# Patient Record
Sex: MALE | Race: WHITE | NOT HISPANIC OR LATINO
[De-identification: names, ages, dates, MRNs, and addresses within clinical notes are randomized per-mention and may not be internally consistent; named-entity substitution may affect disease eponyms.]

---

## 2020-09-02 ENCOUNTER — TRANSCRIPTION ENCOUNTER (OUTPATIENT)
Age: 45
End: 2020-09-02

## 2022-04-06 ENCOUNTER — APPOINTMENT (OUTPATIENT)
Dept: PAIN MANAGEMENT | Facility: CLINIC | Age: 47
End: 2022-04-06
Payer: COMMERCIAL

## 2022-04-06 VITALS — WEIGHT: 140 LBS | HEIGHT: 68 IN | BODY MASS INDEX: 21.22 KG/M2

## 2022-04-06 PROBLEM — Z00.00 ENCOUNTER FOR PREVENTIVE HEALTH EXAMINATION: Status: ACTIVE | Noted: 2022-04-06

## 2022-04-06 PROCEDURE — 99213 OFFICE O/P EST LOW 20 MIN: CPT

## 2022-04-06 NOTE — ASSESSMENT
[FreeTextEntry1] : pt post op surgery #4- posterior cervical fusion pt with increased pain, will refill meds\par UTOX done Utox\par Urine Toxicology Screening-   For the monitoring of chronic pain patients with possible opiod dependence and/or suspected abuse of other illicit drugs.

## 2022-04-06 NOTE — PHYSICAL EXAM
[Able to Communicate] : able to communicate [Normal Skin] : normal skin [Well Developed] : well developed [de-identified] : PHYSICAL EXAM\par \par Constitutional: \par Appears well, no apparent distress\par Ability to communicate: Normal\par Respiratory: non-labored breathing\par Skin: no rash noted\par Head: normocephalic, atraumatic\par Neck: no visible thyroid enlargement\par Eyes: extraocular movements intact\par Neurologic: alert and oriented x3\par Psychiatric: normal mood, affect, and behavior\par \par Cervical:\par Palpation: bilateral trapezial spasm, bilateral trapezius tenderness and bilateral paracervical tenderness\par ROM: Diminished range of motion in all plains.  Patient notes pain at extremes of extension and rotation bilaterally.  Stiffness at extremes of extension and rotation bilaterally\par MMT: Motor exam is 5/5 through out bilateral upper extremities.\par Sensation: Light touch and pain is intact throughout bilateral upper extremities.\par Reflexes: No sustained clonus.\par Special Testing: Positive bilateral Spurling test \par \par Assessemnt:\par Radiculopathy of cervical region (M54.12)\par Cervicalgia (M54.2)\par \par \par Plan:\par After discussing various treatment options with the patient including but not limited to oral medications, physical therapy, exercise modalities as well as interventional spinal injections, we have decided with the following plan:\par \par Continue home exercises, stretching, activity modification, physical therapy, and conservative care.\par \par Medication Renewal\par \par The patient is stable on current pain medication with analgesia and without notable side effects or any obvious aberrant behaviors exhibited. \par There is clinically meaningful improvement in pain and function that outweighs risks to patient safety. I have discussed risks and realistic benefits of opioid therapy and patient and clinician responsibilities for managing therapy. Pain agreement has been signed. \par Will renew medication today.\par \par \par Conservative Care\par Continue Home exercises, stretching, activity modification, physical therapy, and conservative care.\par \par  NO NEW FINDINGS\par Patient denies new numbness, tingling, weakness, fevers, chills and bowel/bladder incontinence\par \par  \par I have consulted the  Registry for the purpose of reviewing the patient's controlled substance\par \par \par \par \par \par  [FreeTextEntry9] : + cervical collar

## 2022-04-06 NOTE — HISTORY OF PRESENT ILLNESS
[Neck] : neck [10] : 10 [Radiating] : radiating [Shooting] : shooting [Tightness] : tightness [] : yes [Constant] : constant [FreeTextEntry1] : patient is coming in for a medication refill

## 2022-04-18 ENCOUNTER — APPOINTMENT (OUTPATIENT)
Dept: PAIN MANAGEMENT | Facility: CLINIC | Age: 47
End: 2022-04-18
Payer: COMMERCIAL

## 2022-04-18 VITALS — BODY MASS INDEX: 23.49 KG/M2 | WEIGHT: 155 LBS | HEIGHT: 68 IN

## 2022-04-18 DIAGNOSIS — Z78.9 OTHER SPECIFIED HEALTH STATUS: ICD-10-CM

## 2022-04-18 PROCEDURE — 99214 OFFICE O/P EST MOD 30 MIN: CPT

## 2022-04-18 NOTE — HISTORY OF PRESENT ILLNESS
[Neck] : neck [10] : 10 [5] : 5 [Constant] : constant [Household chores] : household chores [Leisure] : leisure [Sleep] : sleep [Meds] : meds [Ice] : ice [Sitting] : sitting [Walking] : walking [] : no [FreeTextEntry7] : right shoulder

## 2022-04-18 NOTE — ASSESSMENT
[FreeTextEntry1] : sp cervical fusion 5 weeks prior with persisent post operative pain\par will taper down to 15mg tid for 2 weeks\par \par Medication Renewal\par \par The patient is stable on current pain medication with analgesia and without notable side effects or any obvious aberrant behaviors exhibited. \par There is clinically meaningful improvement in pain and function that outweighs risks to patient safety. I have discussed risks and realistic benefits of opioid therapy and patient and clinician responsibilities for managing therapy. Pain agreement has been signed. \par Will renew medication today.\par \par Follow up in 4-6 weeks or sooner if there are any problems.\par \par Conservative Care\par Continue Home exercises, stretching, activity modification, physical therapy, and conservative care.\par \par  NO NEW FINDINGS\par Patient denies new numbness, tingling, weakness, fevers, chills and bowel/bladder incontinence\par \par  \par I have consulted the  Registry for the purpose of reviewing the patient's controlled substance\par \par utox reviewed and wnl\par

## 2022-04-18 NOTE — PHYSICAL EXAM
[de-identified] : PHYSICAL EXAM\par \par Constitutional: \par Appears well, no apparent distress\par Ability to communicate: Normal\par Respiratory: non-labored breathing\par Skin: no rash noted\par Head: normocephalic, atraumatic\par Neck: no visible thyroid enlargement\par Eyes: extraocular movements intact\par Neurologic: alert and oriented x3\par Psychiatric: normal mood, affect, and behavior\par \par \par Neck: Palpation of the cervical spine is as follows: left trapezial spasm, right trapezial spasm, left trapezial tenderness, right trapezial tenderness, left paracervical spasm, right paracervical spasm, left paracervical tenderness and right paracervical tenderness. Range of motion of the cervical spine is as follows: diminished range of motion in all planes Pain at extremes of rotation to right and rotation to left. Stiffness at extremes of rotation to right and rotation to left. Strength Testing for the cervical spine is as follows: Left Deltoid strength 5/5, Right Deltoid strength 5/5, Left Biceps 5/5, Right Biceps 5/5, Left Triceps 5/5, Right Triceps 5/5, Left Wrist Flexors 5/5, Right Wrist Flexors 5/5, Left Finger Abductors 5/5, Right Finger Abductors 5/5, Left Grasp 5/5 and Right Grasp 5/5 Neurological testing for the cervical spine is as follows: positive bilateral facet loading. light touch is intact throughout both upper extremities, normal deep tendon reflexes bilateral upper extremities, negative Spurling test and negative Ferguson reflex \par \par \par \par \par \par

## 2022-05-05 ENCOUNTER — APPOINTMENT (OUTPATIENT)
Dept: PAIN MANAGEMENT | Facility: CLINIC | Age: 47
End: 2022-05-05
Payer: COMMERCIAL

## 2022-05-05 VITALS — BODY MASS INDEX: 23.49 KG/M2 | WEIGHT: 155 LBS | HEIGHT: 68 IN

## 2022-05-05 DIAGNOSIS — Z78.9 OTHER SPECIFIED HEALTH STATUS: ICD-10-CM

## 2022-05-05 PROCEDURE — 99213 OFFICE O/P EST LOW 20 MIN: CPT

## 2022-05-05 NOTE — DISCUSSION/SUMMARY
[de-identified] : stable on oxycodone 15mg tid and gabapentin\par \par Medication Renewal\par \par The patient is stable on current pain medication with analgesia and without notable side effects or any obvious aberrant behaviors exhibited. \par There is clinically meaningful improvement in pain and function that outweighs risks to patient safety. I have discussed risks and realistic benefits of opioid therapy and patient and clinician responsibilities for managing therapy. Pain agreement has been signed. \par Will renew medication today.\par \par Follow up in 4-6 weeks or sooner if there are any problems.\par \par Conservative Care\par Continue Home exercises, stretching, activity modification, physical therapy, and conservative care.\par \par  NO NEW FINDINGS\par Patient denies new numbness, tingling, weakness, fevers, chills and bowel/bladder incontinence\par \par  \par I have consulted the  Registry for the purpose of reviewing the patient's controlled substance\par \par

## 2022-05-05 NOTE — PHYSICAL EXAM
[de-identified] : PHYSICAL EXAM\par \par Constitutional: \par Appears well, no apparent distress\par Ability to communicate: Normal\par Respiratory: non-labored breathing\par Skin: no rash noted\par Head: normocephalic, atraumatic\par Neck: no visible thyroid enlargement\par Eyes: extraocular movements intact\par Neurologic: alert and oriented x3\par Psychiatric: normal mood, affect, and behavior\par \par Cervical:\par Palpation: left trapezial spasm, left trapezius tenderness and left paracervical tenderness\par ROM: Diminished range of motion in all plains.  Patient notes pain at extremes of extension and rotation to left.  Stiffness at extremes of extension and rotation to the left\par MMT: Motor exam is 5/5 through out bilateral upper extremities.\par Sensation: Light touch and pain is intact throughout bilateral upper extremities.\par Reflexes: No sustained clonus.\par Special Testing: Positive left Spurling test \par \par Assessemnt:\par Radiculopathy of cervical region (M54.12)\par Cervicalgia (M54.2)\par \par

## 2022-05-05 NOTE — HISTORY OF PRESENT ILLNESS
[Neck] : neck [9] : 9 [Burning] : burning [8] : 8 [Radiating] : radiating [Sharp] : sharp [Shooting] : shooting [Stabbing] : stabbing [Tingling] : tingling [Constant] : constant [Sleep] : sleep [Meds] : meds [Sitting] : sitting [Bending forward] : bending forward [Full time] : Work status: full time [] : no [FreeTextEntry6] : numbness [FreeTextEntry7] : down into the left arm  [de-identified] : movement of the neck

## 2022-05-17 ENCOUNTER — APPOINTMENT (OUTPATIENT)
Dept: PAIN MANAGEMENT | Facility: CLINIC | Age: 47
End: 2022-05-17
Payer: COMMERCIAL

## 2022-05-17 VITALS — WEIGHT: 155 LBS | BODY MASS INDEX: 23.49 KG/M2 | HEIGHT: 68 IN

## 2022-05-17 PROCEDURE — 99213 OFFICE O/P EST LOW 20 MIN: CPT

## 2022-05-17 NOTE — DISCUSSION/SUMMARY
[de-identified] : chronic cervical and left cervical radicular pain\par sp posterior fusion two months prior\par will taper down to percocet 10mg tid\par \par Medication Renewal\par \par The patient is stable on current pain medication with analgesia and without notable side effects or any obvious aberrant behaviors exhibited. \par There is clinically meaningful improvement in pain and function that outweighs risks to patient safety. I have discussed risks and realistic benefits of opioid therapy and patient and clinician responsibilities for managing therapy. Pain agreement has been signed. \par Will renew medication today.\par \par Follow up in 4-6 weeks or sooner if there are any problems.\par \par Conservative Care\par Continue Home exercises, stretching, activity modification, physical therapy, and conservative care.\par \par  NO NEW FINDINGS\par Patient denies new numbness, tingling, weakness, fevers, chills and bowel/bladder incontinence\par \par  \par I have consulted the  Registry for the purpose of reviewing the patient's controlled substance\par \par

## 2022-05-17 NOTE — HISTORY OF PRESENT ILLNESS
[Neck] : neck [9] : 9 [4] : 4 [Radiating] : radiating [Sharp] : sharp [Constant] : constant [Work] : work [Sleep] : sleep [Rest] : rest [Meds] : meds [Sitting] : sitting [Lying in bed] : lying in bed [Light duty] : Work status: light duty [] : no [FreeTextEntry7] : left arm  [de-identified] : mri c spine

## 2022-05-17 NOTE — PHYSICAL EXAM

## 2022-05-27 ENCOUNTER — APPOINTMENT (OUTPATIENT)
Dept: PAIN MANAGEMENT | Facility: CLINIC | Age: 47
End: 2022-05-27
Payer: COMMERCIAL

## 2022-05-27 VITALS — BODY MASS INDEX: 23.49 KG/M2 | HEIGHT: 68 IN | WEIGHT: 155 LBS

## 2022-05-27 PROCEDURE — 99214 OFFICE O/P EST MOD 30 MIN: CPT | Mod: 25

## 2022-05-27 PROCEDURE — 20552 NJX 1/MLT TRIGGER POINT 1/2: CPT

## 2022-05-27 PROCEDURE — J3490M: CUSTOM

## 2022-05-27 NOTE — DISCUSSION/SUMMARY
[de-identified] : chronic cervical and left cervical radicular pain\par sp posterior fusion two months prior\par will taper down to percocet 10mg tid\par \par Medication Renewal\par \par The patient is stable on current pain medication with analgesia and without notable side effects or any obvious aberrant behaviors exhibited. \par There is clinically meaningful improvement in pain and function that outweighs risks to patient safety. I have discussed risks and realistic benefits of opioid therapy and patient and clinician responsibilities for managing therapy. Pain agreement has been signed. \par Will renew medication today.\par \par Follow up in 4-6 weeks or sooner if there are any problems.\par \par Conservative Care\par Continue Home exercises, stretching, activity modification, physical therapy, and conservative care.\par \par  NO NEW FINDINGS\par Patient denies new numbness, tingling, weakness, fevers, chills and bowel/bladder incontinence\par \par  \par I have consulted the  Registry for the purpose of reviewing the patient's controlled substance\par \par

## 2022-05-27 NOTE — PHYSICAL EXAM

## 2022-05-27 NOTE — PROCEDURE
[Trigger point 1-2 muscle groups] : trigger point 1-2 muscle groups [Bilateral] : bilaterally of the [Cervical paraspinal muscle] : cervical paraspinal muscle [___ cc    1%] : Lidocaine ~Vcc of 1%  [___ cc    0.25%] : Bupivacaine (Marcaine) ~Vcc of 0.25%  [___ cc    10mg] : Triamcinolone (Kenalog) ~Vcc of 10 mg

## 2022-05-27 NOTE — HISTORY OF PRESENT ILLNESS
[Neck] : neck [Tightness] : tightness [Constant] : constant [Meds] : meds [9] : 9 [4] : 4 [Radiating] : radiating [Sharp] : sharp [Work] : work [Sleep] : sleep [Rest] : rest [Sitting] : sitting [Lying in bed] : lying in bed [Light duty] : Work status: light duty [de-identified] : pt is following up for his neck pain  [] : no [FreeTextEntry7] : left arm  [de-identified] : mri c spine

## 2022-06-08 ENCOUNTER — APPOINTMENT (OUTPATIENT)
Dept: PAIN MANAGEMENT | Facility: CLINIC | Age: 47
End: 2022-06-08
Payer: COMMERCIAL

## 2022-06-08 PROCEDURE — 99213 OFFICE O/P EST LOW 20 MIN: CPT

## 2022-06-08 NOTE — HISTORY OF PRESENT ILLNESS
[Neck] : neck [6] : 6 [Radiating] : radiating [Constant] : constant [Meds] : meds [Sitting] : sitting [Bending forward] : bending forward [de-identified] : pt is following up for medication refill , pain is going into the shoulders  [] : no [de-identified] : movement

## 2022-06-08 NOTE — DISCUSSION/SUMMARY
[de-identified] : Medication Renewal\par \par The patient is stable on current pain medication with analgesia and without notable side effects or any obvious aberrant behaviors exhibited. \par There is clinically meaningful improvement in pain and function that outweighs risks to patient safety. I have discussed risks and realistic benefits of opioid therapy and patient and clinician responsibilities for managing therapy. Pain agreement has been signed. \par Will renew medication today.\par \par Follow up in 4-6 weeks or sooner if there are any problems.\par \par Conservative Care\par Continue Home exercises, stretching, activity modification, physical therapy, and conservative care.\par \par  NO NEW FINDINGS\par Patient denies new numbness, tingling, weakness, fevers, chills and bowel/bladder incontinence\par \par  \par I have consulted the  Registry for the purpose of reviewing the patient's controlled substance\par \par \par will continue percocet 10/3235 at bid dosing\par

## 2022-06-08 NOTE — PHYSICAL EXAM
[de-identified] : PHYSICAL EXAM\par \par Constitutional: \par Appears well, no apparent distress\par Ability to communicate: Normal\par Respiratory: non-labored breathing\par Skin: no rash noted\par Head: normocephalic, atraumatic\par Neck: no visible thyroid enlargement\par Eyes: extraocular movements intact\par Neurologic: alert and oriented x3\par Psychiatric: normal mood, affect, and behavior\par \par \par Neck: Palpation of the cervical spine is as follows: left trapezial spasm, right trapezial spasm, left trapezial tenderness, right trapezial tenderness, left paracervical spasm, right paracervical spasm, left paracervical tenderness and right paracervical tenderness. Range of motion of the cervical spine is as follows: diminished range of motion in all planes Pain at extremes of rotation to right and rotation to left. Stiffness at extremes of rotation to right and rotation to left. Strength Testing for the cervical spine is as follows: Left Deltoid strength 5/5, Right Deltoid strength 5/5, Left Biceps 5/5, Right Biceps 5/5, Left Triceps 5/5, Right Triceps 5/5, Left Wrist Flexors 5/5, Right Wrist Flexors 5/5, Left Finger Abductors 5/5, Right Finger Abductors 5/5, Left Grasp 5/5 and Right Grasp 5/5 Neurological testing for the cervical spine is as follows: positive bilateral facet loading. light touch is intact throughout both upper extremities, normal deep tendon reflexes bilateral upper extremities, negative Spurling test and negative Ferguson reflex \par \par \par

## 2022-06-10 ENCOUNTER — APPOINTMENT (OUTPATIENT)
Dept: PAIN MANAGEMENT | Facility: CLINIC | Age: 47
End: 2022-06-10

## 2022-06-17 ENCOUNTER — APPOINTMENT (OUTPATIENT)
Dept: PAIN MANAGEMENT | Facility: CLINIC | Age: 47
End: 2022-06-17
Payer: COMMERCIAL

## 2022-06-17 VITALS — HEIGHT: 68 IN | BODY MASS INDEX: 23.49 KG/M2 | WEIGHT: 155 LBS

## 2022-06-17 PROCEDURE — 99213 OFFICE O/P EST LOW 20 MIN: CPT

## 2022-06-17 NOTE — PHYSICAL EXAM
[de-identified] : PHYSICAL EXAM\par \par Constitutional: \par Appears well, no apparent distress\par Ability to communicate: Normal\par Respiratory: non-labored breathing\par Skin: no rash noted\par Head: normocephalic, atraumatic\par Neck: no visible thyroid enlargement\par Eyes: extraocular movements intact\par Neurologic: alert and oriented x3\par Psychiatric: normal mood, affect, and behavior\par \par \par Neck: Palpation of the cervical spine is as follows: left trapezial spasm, right trapezial spasm, left trapezial tenderness, right trapezial tenderness, left paracervical spasm, right paracervical spasm, left paracervical tenderness and right paracervical tenderness. Range of motion of the cervical spine is as follows: diminished range of motion in all planes Pain at extremes of rotation to right and rotation to left. Stiffness at extremes of rotation to right and rotation to left. Strength Testing for the cervical spine is as follows: Left Deltoid strength 5/5, Right Deltoid strength 5/5, Left Biceps 5/5, Right Biceps 5/5, Left Triceps 5/5, Right Triceps 5/5, Left Wrist Flexors 5/5, Right Wrist Flexors 5/5, Left Finger Abductors 5/5, Right Finger Abductors 5/5, Left Grasp 5/5 and Right Grasp 5/5 Neurological testing for the cervical spine is as follows: positive bilateral facet loading. light touch is intact throughout both upper extremities, normal deep tendon reflexes bilateral upper extremities, negative Spurling test and negative Ferguson reflex \par \par \par

## 2022-06-17 NOTE — DISCUSSION/SUMMARY
[de-identified] : Medication Renewal\par \par The patient is stable on current pain medication with analgesia and without notable side effects or any obvious aberrant behaviors exhibited. \par There is clinically meaningful improvement in pain and function that outweighs risks to patient safety. I have discussed risks and realistic benefits of opioid therapy and patient and clinician responsibilities for managing therapy. Pain agreement has been signed. \par Will renew medication today.\par \par Follow up in 4-6 weeks or sooner if there are any problems.\par \par Conservative Care\par Continue Home exercises, stretching, activity modification, physical therapy, and conservative care.\par \par  NO NEW FINDINGS\par Patient denies new numbness, tingling, weakness, fevers, chills and bowel/bladder incontinence\par \par  \par I have consulted the  Registry for the purpose of reviewing the patient's controlled substance\par \par \par will continue percocet 10/3235 at bid dosing \par will taper\par

## 2022-06-17 NOTE — HISTORY OF PRESENT ILLNESS
[Neck] : neck [Radiating] : radiating [Constant] : constant [Meds] : meds [6] : 6 [Sitting] : sitting [Bending forward] : bending forward [de-identified] : pt is following up for medication refill , pain is going into the shoulders  [] : no [FreeTextEntry7] : left arm  [de-identified] : movement

## 2022-06-17 NOTE — ASSESSMENT
[FreeTextEntry1] : The patient is stable on current pain medication with analgesia and without notable side effects or any obvious aberrant behaviors exhibited.  \par There is clinically meaningful improvement in pain and function that outweighs risks to patient safety.  I have discussed risks and realistic benefits of opioid therapy and patient and clinician responsibilities for managing therapy.  Pain agreement has been signed.\par Will renew medication today.\par

## 2022-07-01 ENCOUNTER — APPOINTMENT (OUTPATIENT)
Dept: PAIN MANAGEMENT | Facility: CLINIC | Age: 47
End: 2022-07-01

## 2022-07-01 VITALS — HEIGHT: 68 IN | BODY MASS INDEX: 23.49 KG/M2 | WEIGHT: 155 LBS

## 2022-07-01 PROCEDURE — 99213 OFFICE O/P EST LOW 20 MIN: CPT

## 2022-07-01 NOTE — PHYSICAL EXAM

## 2022-07-01 NOTE — HISTORY OF PRESENT ILLNESS
[Neck] : neck [7] : 7 [6] : 6 [Radiating] : radiating [Sharp] : sharp [Constant] : constant [Sleep] : sleep [Meds] : meds [de-identified] : pt is following up for neck pain , is having pain in the left side on the top  [] : no [FreeTextEntry7] : left side  [de-identified] : getting up in the morning

## 2022-07-01 NOTE — DISCUSSION/SUMMARY
[de-identified] : sp cervical surgery 6/27/22 to remove screw with resultant exacerbation of radicular pain\par will rotate medication to hydrocodone.  \par \par Medication Renewal\par \par The patient is stable on current pain medication with analgesia and without notable side effects or any obvious aberrant behaviors exhibited. \par There is clinically meaningful improvement in pain and function that outweighs risks to patient safety. I have discussed risks and realistic benefits of opioid therapy and patient and clinician responsibilities for managing therapy. Pain agreement has been signed. \par Will renew medication today.\par \par Follow up in 4-6 weeks or sooner if there are any problems.\par \par Conservative Care\par Continue Home exercises, stretching, activity modification, physical therapy, and conservative care.\par \par  NO NEW FINDINGS\par Patient denies new numbness, tingling, weakness, fevers, chills and bowel/bladder incontinence\par \par  \par I have consulted the  Registry for the purpose of reviewing the patient's controlled substance\par \par

## 2022-07-15 ENCOUNTER — APPOINTMENT (OUTPATIENT)
Dept: PAIN MANAGEMENT | Facility: CLINIC | Age: 47
End: 2022-07-15

## 2022-07-15 VITALS — WEIGHT: 150 LBS | BODY MASS INDEX: 22.73 KG/M2 | HEIGHT: 68 IN

## 2022-07-15 PROCEDURE — 99213 OFFICE O/P EST LOW 20 MIN: CPT

## 2022-07-15 NOTE — HISTORY OF PRESENT ILLNESS
[Neck] : neck [7] : 7 [5] : 5 [Radiating] : radiating [Constant] : constant [Sleep] : sleep [Meds] : meds [Lying in bed] : lying in bed [de-identified] : pt is following up for c spine pain and medication refill  [] : no

## 2022-07-15 NOTE — PHYSICAL EXAM

## 2022-07-15 NOTE — DISCUSSION/SUMMARY
[de-identified] : patient sp multilevel anterior and posterior fusion\par last surgery 6/27 for screw removal.\par still with substantial axial and left radicular pain\par continue gabapentin \par start ultram er 200mg and percocet bid 10/325\par follow up in 2 weeks\par \par Medication Renewal\par \par The patient is stable on current pain medication with analgesia and without notable side effects or any obvious aberrant behaviors exhibited. \par There is clinically meaningful improvement in pain and function that outweighs risks to patient safety. I have discussed risks and realistic benefits of opioid therapy and patient and clinician responsibilities for managing therapy. Pain agreement has been signed. \par Will renew medication today.\par \par Follow up in 4-6 weeks or sooner if there are any problems.\par \par Conservative Care\par Continue Home exercises, stretching, activity modification, physical therapy, and conservative care.\par \par  NO NEW FINDINGS\par Patient denies new numbness, tingling, weakness, fevers, chills and bowel/bladder incontinence\par \par  \par I have consulted the  Registry for the purpose of reviewing the patient's controlled substance\par \par

## 2022-07-28 ENCOUNTER — APPOINTMENT (OUTPATIENT)
Dept: PAIN MANAGEMENT | Facility: CLINIC | Age: 47
End: 2022-07-28

## 2022-07-28 VITALS — HEIGHT: 68 IN | BODY MASS INDEX: 22.73 KG/M2 | WEIGHT: 150 LBS

## 2022-07-28 PROCEDURE — 99213 OFFICE O/P EST LOW 20 MIN: CPT

## 2022-07-28 NOTE — HISTORY OF PRESENT ILLNESS
[Neck] : neck [7] : 7 [5] : 5 [Radiating] : radiating [Constant] : constant [Meds] : meds [Lying in bed] : lying in bed [de-identified] : pt is following up for c spine and medication refill  [] : no

## 2022-07-28 NOTE — DISCUSSION/SUMMARY
[de-identified] : doing well with ultram er 200mg and percocet for breathrough\par will renew percocet for 2 weeks and add 100mg tramadol\par follow up in 2 weeks.

## 2022-07-28 NOTE — PHYSICAL EXAM
[de-identified] : PHYSICAL EXAM\par \par Constitutional: \par Appears well, no apparent distress\par Ability to communicate: Normal\par Respiratory: non-labored breathing\par Skin: no rash noted\par Head: normocephalic, atraumatic\par Neck: no visible thyroid enlargement\par Eyes: extraocular movements intact\par Neurologic: alert and oriented x3\par Psychiatric: normal mood, affect, and behavior\par \par Cervical:\par Palpation: left trapezial spasm, left trapezius tenderness and left paracervical tenderness\par ROM: Diminished range of motion in all plains.  Patient notes pain at extremes of extension and rotation to left.  Stiffness at extremes of extension and rotation to the left\par MMT: Motor exam is 5/5 through out bilateral upper extremities.\par Sensation: Light touch and pain is intact throughout bilateral upper extremities.\par Reflexes: No sustained clonus.\par Special Testing: Positive left Spurling test \par \par

## 2022-08-10 ENCOUNTER — APPOINTMENT (OUTPATIENT)
Dept: PAIN MANAGEMENT | Facility: CLINIC | Age: 47
End: 2022-08-10

## 2022-08-10 VITALS — HEIGHT: 68 IN | BODY MASS INDEX: 22.73 KG/M2 | WEIGHT: 150 LBS

## 2022-08-10 PROCEDURE — 99214 OFFICE O/P EST MOD 30 MIN: CPT

## 2022-08-10 NOTE — PHYSICAL EXAM
[de-identified] : PHYSICAL EXAM\par \par Constitutional: \par Appears well, no apparent distress\par Ability to communicate: Normal\par Respiratory: non-labored breathing\par Skin: no rash noted\par Head: normocephalic, atraumatic\par Neck: no visible thyroid enlargement\par Eyes: extraocular movements intact\par Neurologic: alert and oriented x3\par Psychiatric: normal mood, affect, and behavior\par \par Cervical:\par Palpation: left trapezial spasm, left trapezius tenderness and left paracervical tenderness\par ROM: Diminished range of motion in all plains.  Patient notes pain at extremes of extension and rotation to left.  Stiffness at extremes of extension and rotation to the left\par MMT: Motor exam is 5/5 through out bilateral upper extremities.\par Sensation: Light touch and pain is intact throughout bilateral upper extremities.\par Reflexes: No sustained clonus.\par Special Testing: Positive left Spurling test \par \par

## 2022-08-10 NOTE — DISCUSSION/SUMMARY
[de-identified] : stable on current medical regimen with no side effects\par will renew for a month and consider reducing morning am percocet dose and rotating to tramadol\par utox today

## 2022-08-10 NOTE — HISTORY OF PRESENT ILLNESS
[Neck] : neck [7] : 7 [4] : 4 [Radiating] : radiating [Sharp] : sharp [Constant] : constant [Meds] : meds [Ice] : ice [Sitting] : sitting [Standing] : standing [] : no [FreeTextEntry7] : left arm  [de-identified] : c mri

## 2022-09-07 ENCOUNTER — APPOINTMENT (OUTPATIENT)
Dept: PAIN MANAGEMENT | Facility: CLINIC | Age: 47
End: 2022-09-07

## 2022-09-07 PROCEDURE — 99213 OFFICE O/P EST LOW 20 MIN: CPT

## 2022-09-08 NOTE — PHYSICAL EXAM
[de-identified] : PHYSICAL EXAM\par \par Constitutional: \par Appears well, no apparent distress\par Ability to communicate: Normal\par Respiratory: non-labored breathing\par Skin: no rash noted\par Head: normocephalic, atraumatic\par Neck: no visible thyroid enlargement\par Eyes: extraocular movements intact\par Neurologic: alert and oriented x3\par Psychiatric: normal mood, affect, and behavior\par \par Cervical:\par Palpation: left trapezial spasm, left trapezius tenderness and left paracervical tenderness\par ROM: Diminished range of motion in all plains.  Patient notes pain at extremes of extension and rotation to left.  Stiffness at extremes of extension and rotation to the left\par MMT: Motor exam is 5/5 through out bilateral upper extremities.\par Sensation: Light touch and pain is intact throughout bilateral upper extremities.\par Reflexes: No sustained clonus.\par Special Testing: Positive left Spurling test \par \par

## 2022-09-08 NOTE — HISTORY OF PRESENT ILLNESS
[Neck] : neck [7] : 7 [4] : 4 [Radiating] : radiating [Sharp] : sharp [Constant] : constant [Meds] : meds [Ice] : ice [Sitting] : sitting [Standing] : standing [] : no [FreeTextEntry7] : left arm  [de-identified] : c mri

## 2022-09-08 NOTE — DISCUSSION/SUMMARY
[de-identified] : stable on current medical regimen with no side effects\par \par Medication Renewal\par \par The patient is stable on current pain medication with analgesia and without notable side effects or any obvious aberrant behaviors exhibited. \par There is clinically meaningful improvement in pain and function that outweighs risks to patient safety. I have discussed risks and realistic benefits of opioid therapy and patient and clinician responsibilities for managing therapy. Pain agreement has been signed. \par Will renew medication today.\par \par Follow up in 4-6 weeks or sooner if there are any problems.\par \par Conservative Care\par Continue Home exercises, stretching, activity modification, physical therapy, and conservative care.\par \par  NO NEW FINDINGS\par Patient denies new numbness, tingling, weakness, fevers, chills and bowel/bladder incontinence\par \par  \par I have consulted the  Registry for the purpose of reviewing the patient's controlled substance\par \par

## 2022-09-16 ENCOUNTER — APPOINTMENT (OUTPATIENT)
Dept: PAIN MANAGEMENT | Facility: CLINIC | Age: 47
End: 2022-09-16

## 2022-09-16 PROCEDURE — 62321 NJX INTERLAMINAR CRV/THRC: CPT

## 2022-09-16 NOTE — PROCEDURE
[FreeTextEntry3] : Date of Service: 09/16/2022 \par \par Account: [id number]\par \par Patient: LEONOR RODRIGUEZ \par \par YOB: 1975\par \par Age: 47 year\par \par \par Surgeon: Ramiro Boles M.D.\par \par Pre-Operative Diagnosis: Cervical Radiculopathy\par \par Post Operative Diagnosis: Cervical Radiculopathy\par \par Procedure: Interlaminar cervical epidural steroid injection (C7-T1) under fluoroscopic guidance \par \par Anesthesia: MAC\par \par \par This procedure was carried out using fluoroscopic guidance.  The risks and benefits of the procedure were discussed extensively with the patient.  The consent of the patient was obtained and the following procedure was performed.\par \par The patient was placed in the prone position using a thoracic and chin support.  The cervical area was prepped and draped in a sterile fashion.  The fluoroscope visualized the C7-T1 interspace using slight cephalad-caudad angulation and this area was marked.  Using sterile technique the superficial skin was anesthetized with 1% Lidocaine without epinephrine.  A 18 gauge Tuohoy needle was advanced under fluoroscopy using [id number] technique until ligament was engaged.  The stilette was then removed and a column of preservative free normal saline flushed through the tuohoy needle and left with a concave fluid level above the butterfly portion of the tuohoy needle.  The needle was then advanced under fluoroscopic guidance until the column of saline disappeared.  Lateral view confirmed final needle tip placement in the epidural space.  After negative aspiration for heme and CSF, 1 cc of omnipaque confirmed good cervical epiduragram.  \par \par Cervical epidurogram showed no evidence of intrathecal or intravascular flow, and good bilateral epidural flow from C3 to T2 levels.  \par \par An injectate of 3cc of preservative free normal saline plus 12 mg of betamethasone was then injected into the epidural space. The needle was subsequently removed and pressure was applied.\par \par Anesthesia personnel were present throughout the procedure.  The patient tolerated the procedure well and was instructed to contact me immediately if there were any problems.\par \par \par Ramiro Boles M.D.\par

## 2022-10-03 ENCOUNTER — NON-APPOINTMENT (OUTPATIENT)
Age: 47
End: 2022-10-03

## 2022-10-03 ENCOUNTER — APPOINTMENT (OUTPATIENT)
Dept: PAIN MANAGEMENT | Facility: CLINIC | Age: 47
End: 2022-10-03

## 2022-10-03 PROCEDURE — 99214 OFFICE O/P EST MOD 30 MIN: CPT | Mod: 25

## 2022-10-03 PROCEDURE — 20552 NJX 1/MLT TRIGGER POINT 1/2: CPT

## 2022-10-03 PROCEDURE — J3490M: CUSTOM

## 2022-10-03 RX ORDER — DULOXETINE HYDROCHLORIDE 30 MG/1
30 CAPSULE, DELAYED RELEASE PELLETS ORAL TWICE DAILY
Qty: 60 | Refills: 0 | Status: ACTIVE | COMMUNITY
Start: 2022-10-03 | End: 1900-01-01

## 2022-10-03 RX ORDER — TRAMADOL HYDROCHLORIDE 50 MG/1
50 TABLET, COATED ORAL
Qty: 60 | Refills: 0 | Status: ACTIVE | COMMUNITY
Start: 2022-07-28 | End: 1900-01-01

## 2022-10-03 NOTE — DISCUSSION/SUMMARY
[de-identified] : stable on current medical regimen with no side effects, no relief with GUIDO\par will add cymbalta 30mg bid and baclofen \par \par Medication Renewal\par \par The patient is stable on current pain medication with analgesia and without notable side effects or any obvious aberrant behaviors exhibited. \par There is clinically meaningful improvement in pain and function that outweighs risks to patient safety. I have discussed risks and realistic benefits of opioid therapy and patient and clinician responsibilities for managing therapy. Pain agreement has been signed. \par Will renew medication today.\par \par Follow up in 4-6 weeks or sooner if there are any problems.\par \par Conservative Care\par Continue Home exercises, stretching, activity modification, physical therapy, and conservative care.\par \par  NO NEW FINDINGS\par Patient denies new numbness, tingling, weakness, fevers, chills and bowel/bladder incontinence\par \par  \par I have consulted the  Registry for the purpose of reviewing the patient's controlled substance\par \par

## 2022-10-03 NOTE — HISTORY OF PRESENT ILLNESS
[Neck] : neck [7] : 7 [4] : 4 [Radiating] : radiating [Sharp] : sharp [Constant] : constant [Meds] : meds [Ice] : ice [Sitting] : sitting [Standing] : standing [] : no [FreeTextEntry7] : left arm  [de-identified] : c mri

## 2022-10-03 NOTE — PROCEDURE
[Trigger point 1-2 muscle groups] : trigger point 1-2 muscle groups [Bilateral] : bilaterally of the [Cervical paraspinal muscle] : cervical paraspinal muscle [Alcohol] : alcohol [Ethyl Chloride sprayed topically] : ethyl chloride sprayed topically [Sterile technique used] : sterile technique used [___ cc    1%] : Lidocaine ~Vcc of 1%  [___ cc    0.25%] : Bupivacaine (Marcaine) ~Vcc of 0.25%  [___ cc    10mg] : Triamcinolone (Kenalog) ~Vcc of 10 mg  [] : Patient tolerated procedure well [Call if redness, pain or fever occur] : call if redness, pain or fever occur [Risks, benefits, alternatives discussed / Verbal consent obtained] : the risks benefits, and alternatives have been discussed, and verbal consent was obtained

## 2022-10-03 NOTE — PHYSICAL EXAM
[de-identified] : PHYSICAL EXAM\par \par Constitutional: \par Appears well, no apparent distress\par Ability to communicate: Normal\par Respiratory: non-labored breathing\par Skin: no rash noted\par Head: normocephalic, atraumatic\par Neck: no visible thyroid enlargement\par Eyes: extraocular movements intact\par Neurologic: alert and oriented x3\par Psychiatric: normal mood, affect, and behavior\par \par Cervical:\par Palpation: left trapezial spasm, left trapezius tenderness and left paracervical tenderness\par ROM: Diminished range of motion in all plains.  Patient notes pain at extremes of extension and rotation to left.  Stiffness at extremes of extension and rotation to the left\par MMT: Motor exam is 5/5 through out bilateral upper extremities.\par Sensation: Light touch and pain is intact throughout bilateral upper extremities.\par Reflexes: No sustained clonus.\par Special Testing: Positive left Spurling test \par \par

## 2022-10-07 ENCOUNTER — APPOINTMENT (OUTPATIENT)
Dept: PAIN MANAGEMENT | Facility: CLINIC | Age: 47
End: 2022-10-07

## 2022-10-10 RX ORDER — CELECOXIB 200 MG/1
200 CAPSULE ORAL DAILY
Qty: 30 | Refills: 0 | Status: ACTIVE | COMMUNITY
Start: 2022-10-10 | End: 1900-01-01

## 2022-10-10 RX ORDER — DULOXETINE HYDROCHLORIDE 30 MG/1
30 CAPSULE, DELAYED RELEASE PELLETS ORAL TWICE DAILY
Qty: 60 | Refills: 0 | Status: ACTIVE | COMMUNITY
Start: 2022-10-10 | End: 1900-01-01

## 2022-10-12 ENCOUNTER — FORM ENCOUNTER (OUTPATIENT)
Age: 47
End: 2022-10-12

## 2022-10-13 ENCOUNTER — APPOINTMENT (OUTPATIENT)
Dept: MRI IMAGING | Facility: CLINIC | Age: 47
End: 2022-10-13

## 2022-10-13 PROCEDURE — 72141 MRI NECK SPINE W/O DYE: CPT

## 2022-10-28 ENCOUNTER — APPOINTMENT (OUTPATIENT)
Dept: PAIN MANAGEMENT | Facility: CLINIC | Age: 47
End: 2022-10-28

## 2022-10-28 VITALS — WEIGHT: 150 LBS | HEIGHT: 68 IN | BODY MASS INDEX: 22.73 KG/M2

## 2022-10-28 PROCEDURE — 99213 OFFICE O/P EST LOW 20 MIN: CPT

## 2022-10-28 NOTE — PHYSICAL EXAM
[de-identified] : PHYSICAL EXAM\par \par Constitutional: \par Appears well, no apparent distress\par Ability to communicate: Normal\par Respiratory: non-labored breathing\par Skin: no rash noted\par Head: normocephalic, atraumatic\par Neck: no visible thyroid enlargement\par Eyes: extraocular movements intact\par Neurologic: alert and oriented x3\par Psychiatric: normal mood, affect, and behavior\par \par Cervical:\par Palpation: left trapezial spasm, left trapezius tenderness and left paracervical tenderness\par ROM: Diminished range of motion in all plains.  Patient notes pain at extremes of extension and rotation to left.  Stiffness at extremes of extension and rotation to the left\par MMT: Motor exam is 5/5 through out bilateral upper extremities.\par Sensation: Light touch and pain is intact throughout bilateral upper extremities.\par Reflexes: No sustained clonus.\par Special Testing: Positive left Spurling test \par \par

## 2022-10-28 NOTE — DISCUSSION/SUMMARY
[de-identified] : Medication Renewal\par \par The patient is stable on current pain medication with analgesia and without notable side effects or any obvious aberrant behaviors exhibited. \par There is clinically meaningful improvement in pain and function that outweighs risks to patient safety. I have discussed risks and realistic benefits of opioid therapy and patient and clinician responsibilities for managing therapy. Pain agreement has been signed. \par Will renew medication today.\par \par Follow up in 4-6 weeks or sooner if there are any problems.\par \par Conservative Care\par Continue Home exercises, stretching, activity modification, physical therapy, and conservative care.\par \par  NO NEW FINDINGS\par Patient denies new numbness, tingling, weakness, fevers, chills and bowel/bladder incontinence\par \par  \par I have consulted the  Registry for the purpose of reviewing the patient's controlled substance\par \par will increase percocet to tid dosing and will dc the tramadol ir\par \par \par

## 2022-10-28 NOTE — HISTORY OF PRESENT ILLNESS
[Neck] : neck [7] : 7 [4] : 4 [Radiating] : radiating [Sharp] : sharp [Constant] : constant [Meds] : meds [Ice] : ice [Sitting] : sitting [Standing] : standing [Walking] : walking [de-identified] : pt is following up for medication  [] : no [de-identified] : movement

## 2022-10-31 ENCOUNTER — APPOINTMENT (OUTPATIENT)
Dept: PAIN MANAGEMENT | Facility: CLINIC | Age: 47
End: 2022-10-31

## 2022-11-02 RX ORDER — TRAMADOL HYDROCHLORIDE 300 MG/1
300 TABLET, EXTENDED RELEASE ORAL DAILY
Qty: 30 | Refills: 0 | Status: DISCONTINUED | COMMUNITY
Start: 2022-07-15 | End: 2022-11-02

## 2022-11-21 RX ORDER — HYDROMORPHONE HYDROCHLORIDE 4 MG/1
4 TABLET ORAL
Qty: 6 | Refills: 0 | Status: ACTIVE | COMMUNITY
Start: 2022-11-21 | End: 1900-01-01

## 2022-11-23 ENCOUNTER — APPOINTMENT (OUTPATIENT)
Dept: PAIN MANAGEMENT | Facility: CLINIC | Age: 47
End: 2022-11-23

## 2022-11-23 VITALS — WEIGHT: 150 LBS | BODY MASS INDEX: 22.73 KG/M2 | HEIGHT: 68 IN

## 2022-11-23 PROCEDURE — 99213 OFFICE O/P EST LOW 20 MIN: CPT

## 2022-11-23 RX ORDER — HYDROMORPHONE HYDROCHLORIDE 4 MG/1
4 TABLET ORAL
Qty: 120 | Refills: 0 | Status: ACTIVE | COMMUNITY
Start: 2022-11-23 | End: 1900-01-01

## 2022-11-23 NOTE — HISTORY OF PRESENT ILLNESS
[Neck] : neck [7] : 7 [4] : 4 [Radiating] : radiating [Sharp] : sharp [Constant] : constant [Meds] : meds [Ice] : ice [Sitting] : sitting [Standing] : standing [Walking] : walking [de-identified] : pt is following up for medication  [] : no [de-identified] : movement

## 2022-11-23 NOTE — PHYSICAL EXAM
[de-identified] : PHYSICAL EXAM\par \par Constitutional: \par Appears well, no apparent distress\par Ability to communicate: Normal\par Respiratory: non-labored breathing\par Skin: no rash noted\par Head: normocephalic, atraumatic\par Neck: no visible thyroid enlargement\par Eyes: extraocular movements intact\par Neurologic: alert and oriented x3\par Psychiatric: normal mood, affect, and behavior\par \par Cervical:\par Palpation: left trapezial spasm, left trapezius tenderness and left paracervical tenderness\par ROM: Diminished range of motion in all plains.  Patient notes pain at extremes of extension and rotation to left.  Stiffness at extremes of extension and rotation to the left\par MMT: Motor exam is 5/5 through out bilateral upper extremities.\par Sensation: Light touch and pain is intact throughout bilateral upper extremities.\par Reflexes: No sustained clonus.\par Special Testing: Positive left Spurling test \par \par

## 2022-11-23 NOTE — DISCUSSION/SUMMARY
[de-identified] : Medication Renewal\par \par The patient is stable on current pain medication with analgesia and without notable side effects or any obvious aberrant behaviors exhibited. \par There is clinically meaningful improvement in pain and function that outweighs risks to patient safety. I have discussed risks and realistic benefits of opioid therapy and patient and clinician responsibilities for managing therapy. Pain agreement has been signed. \par Will renew medication today.\par \par Follow up in 4-6 weeks or sooner if there are any problems.\par \par Conservative Care\par Continue Home exercises, stretching, activity modification, physical therapy, and conservative care.\par \par  NO NEW FINDINGS\par Patient denies new numbness, tingling, weakness, fevers, chills and bowel/bladder incontinence\par \par  \par I have consulted the  Registry for the purpose of reviewing the patient's controlled substance\par \par will increase percocet to tid dosing and will dc the tramadol ir\par \par \par

## 2022-12-16 ENCOUNTER — APPOINTMENT (OUTPATIENT)
Dept: PAIN MANAGEMENT | Facility: CLINIC | Age: 47
End: 2022-12-16

## 2022-12-16 PROCEDURE — 99213 OFFICE O/P EST LOW 20 MIN: CPT

## 2022-12-16 NOTE — PHYSICAL EXAM
[de-identified] : PHYSICAL EXAM\par \par Constitutional: \par Appears well, no apparent distress\par Ability to communicate: Normal\par Respiratory: non-labored breathing\par Skin: no rash noted\par Head: normocephalic, atraumatic\par Neck: no visible thyroid enlargement\par Eyes: extraocular movements intact\par Neurologic: alert and oriented x3\par Psychiatric: normal mood, affect, and behavior\par \par Cervical:\par Palpation: left trapezial spasm, left trapezius tenderness and left paracervical tenderness\par ROM: Diminished range of motion in all plains.  Patient notes pain at extremes of extension and rotation to left.  Stiffness at extremes of extension and rotation to the left\par MMT: Motor exam is 5/5 through out bilateral upper extremities.\par Sensation: Light touch and pain is intact throughout bilateral upper extremities.\par Reflexes: No sustained clonus.\par Special Testing: Positive left Spurling test \par \par

## 2022-12-16 NOTE — HISTORY OF PRESENT ILLNESS
[de-identified] : pt is following up for medication  [Neck] : neck [7] : 7 [4] : 4 [Radiating] : radiating [Sharp] : sharp [Constant] : constant [Meds] : meds [Ice] : ice [Sitting] : sitting [Standing] : standing [Walking] : walking [] : no [de-identified] : movement

## 2022-12-16 NOTE — DISCUSSION/SUMMARY
[de-identified] : Medication Renewal\par \par The patient is stable on current pain medication with analgesia and without notable side effects or any obvious aberrant behaviors exhibited. \par There is clinically meaningful improvement in pain and function that outweighs risks to patient safety. I have discussed risks and realistic benefits of opioid therapy and patient and clinician responsibilities for managing therapy. Pain agreement has been signed. \par Will renew medication today.\par \par Follow up in 4-6 weeks or sooner if there are any problems.\par \par Conservative Care\par Continue Home exercises, stretching, activity modification, physical therapy, and conservative care.\par \par  NO NEW FINDINGS\par Patient denies new numbness, tingling, weakness, fevers, chills and bowel/bladder incontinence\par \par  \par I have consulted the  Registry for the purpose of reviewing the patient's controlled substance\par \par will increase percocet to tid dosing and will dc the tramadol ir\par \par \par

## 2023-01-06 ENCOUNTER — APPOINTMENT (OUTPATIENT)
Dept: PAIN MANAGEMENT | Facility: CLINIC | Age: 48
End: 2023-01-06
Payer: COMMERCIAL

## 2023-01-06 VITALS — HEIGHT: 68 IN | BODY MASS INDEX: 22.73 KG/M2 | WEIGHT: 150 LBS

## 2023-01-06 PROCEDURE — 99214 OFFICE O/P EST MOD 30 MIN: CPT | Mod: 25

## 2023-01-06 PROCEDURE — 20552 NJX 1/MLT TRIGGER POINT 1/2: CPT

## 2023-01-06 PROCEDURE — 96372 THER/PROPH/DIAG INJ SC/IM: CPT | Mod: 59

## 2023-01-06 PROCEDURE — J3490M: CUSTOM

## 2023-01-06 PROCEDURE — 72050 X-RAY EXAM NECK SPINE 4/5VWS: CPT

## 2023-01-06 RX ORDER — TRAMADOL HYDROCHLORIDE 200 MG/1
200 TABLET, EXTENDED RELEASE ORAL
Qty: 30 | Refills: 0 | Status: ACTIVE | COMMUNITY
Start: 2022-11-02 | End: 1900-01-01

## 2023-01-06 NOTE — PROCEDURE
[Trigger point 1-2 muscle groups] : trigger point 1-2 muscle groups [Bilateral] : bilaterally of the [Cervical paraspinal muscle] : cervical paraspinal muscle [Pain] : pain [Inflammation] : inflammation [Alcohol] : alcohol [Ethyl Chloride sprayed topically] : ethyl chloride sprayed topically [Sterile technique used] : sterile technique used [___ cc    1%] : Lidocaine ~Vcc of 1%  [___ cc    0.25%] : Bupivacaine (Marcaine) ~Vcc of 0.25%  [___ cc    10mg] : Triamcinolone (Kenalog) ~Vcc of 10 mg  [___ cc    30mg] : Ketorolac (Toradol) ~Vcc of 30 mg  [] : Patient tolerated procedure well [Call if redness, pain or fever occur] : call if redness, pain or fever occur [Risks, benefits, alternatives discussed / Verbal consent obtained] : the risks benefits, and alternatives have been discussed, and verbal consent was obtained

## 2023-01-06 NOTE — HISTORY OF PRESENT ILLNESS
[Neck] : neck [7] : 7 [4] : 4 [Radiating] : radiating [Sharp] : sharp [Constant] : constant [Meds] : meds [Ice] : ice [Sitting] : sitting [Standing] : standing [Walking] : walking [de-identified] : pt is following up for c spine pain  [] : no [de-identified] : movement

## 2023-01-06 NOTE — PHYSICAL EXAM
[de-identified] : PHYSICAL EXAM\par \par Constitutional: \par Appears well, no apparent distress\par Ability to communicate: Normal\par Respiratory: non-labored breathing\par Skin: no rash noted\par Head: normocephalic, atraumatic\par Neck: no visible thyroid enlargement\par Eyes: extraocular movements intact\par Neurologic: alert and oriented x3\par Psychiatric: normal mood, affect, and behavior\par \par Cervical:\par Palpation: left trapezial spasm, left trapezius tenderness and left paracervical tenderness\par ROM: Diminished range of motion in all plains.  Patient notes pain at extremes of extension and rotation to left.  Stiffness at extremes of extension and rotation to the left\par MMT: Motor exam is 5/5 through out bilateral upper extremities.\par Sensation: Light touch and pain is intact throughout bilateral upper extremities.\par Reflexes: No sustained clonus.\par Special Testing: Positive left Spurling test \par \par

## 2023-01-06 NOTE — DISCUSSION/SUMMARY
[de-identified] : \par Xray of cervical spine:  s/p anterior and posterior fusion from C3-C7.  Hardware intact. no evidence of hardware/screw migration. . mild degenerative disc disease at C2-3. mild degenerative facet arthrosis C3-7. \par \par \par \par Medication Renewal\par \par The patient is stable on current pain medication with analgesia and without notable side effects or any obvious aberrant behaviors exhibited. \par There is clinically meaningful improvement in pain and function that outweighs risks to patient safety. I have discussed risks and realistic benefits of opioid therapy and patient and clinician responsibilities for managing therapy. Pain agreement has been signed. \par Will renew medication today.\par \par Follow up in 4-6 weeks or sooner if there are any problems.\par \par Conservative Care\par Continue Home exercises, stretching, activity modification, physical therapy, and conservative care.\par \par  NO NEW FINDINGS\par Patient denies new numbness, tingling, weakness, fevers, chills and bowel/bladder incontinence\par \par  \par I have consulted the  Registry for the purpose of reviewing the patient's controlled substance\par \par will increase percocet to tid dosing and will dc the tramadol ir\par \par \par

## 2023-01-09 RX ORDER — TRAMADOL HYDROCHLORIDE 300 MG/1
300 TABLET, EXTENDED RELEASE ORAL
Qty: 30 | Refills: 0 | Status: ACTIVE | COMMUNITY
Start: 2023-01-09 | End: 1900-01-01

## 2023-01-11 ENCOUNTER — APPOINTMENT (OUTPATIENT)
Dept: CT IMAGING | Facility: CLINIC | Age: 48
End: 2023-01-11

## 2023-01-18 ENCOUNTER — FORM ENCOUNTER (OUTPATIENT)
Age: 48
End: 2023-01-18

## 2023-01-19 ENCOUNTER — APPOINTMENT (OUTPATIENT)
Dept: CT IMAGING | Facility: CLINIC | Age: 48
End: 2023-01-19
Payer: COMMERCIAL

## 2023-01-19 PROCEDURE — 72125 CT NECK SPINE W/O DYE: CPT

## 2023-02-03 ENCOUNTER — APPOINTMENT (OUTPATIENT)
Dept: PAIN MANAGEMENT | Facility: CLINIC | Age: 48
End: 2023-02-03
Payer: COMMERCIAL

## 2023-02-03 VITALS — WEIGHT: 150 LBS | HEIGHT: 68 IN | BODY MASS INDEX: 22.73 KG/M2

## 2023-02-03 PROCEDURE — 99214 OFFICE O/P EST MOD 30 MIN: CPT

## 2023-02-03 RX ORDER — OXYCODONE HYDROCHLORIDE 20 MG/1
20 TABLET, EXTENDED RELEASE ORAL AT BEDTIME
Qty: 30 | Refills: 0 | Status: ACTIVE | COMMUNITY
Start: 2023-02-03 | End: 1900-01-01

## 2023-02-03 NOTE — DISCUSSION/SUMMARY
[Medication Risks Reviewed] : Medication risks reviewed [de-identified] : Medication Renewal\par \par The patient is stable on current pain medication with analgesia and without notable side effects or any obvious aberrant behaviors exhibited. \par There is clinically meaningful improvement in pain and function that outweighs risks to patient safety. I have discussed risks and realistic benefits of opioid therapy and patient and clinician responsibilities for managing therapy. Pain agreement has been signed. \par Will renew medication today.\par \par Follow up in 4-6 weeks\par Follow up in 4-6 weeks or sooner if there are any problems.\par \par Conservative Care\par Continue Home exercises, stretching, activity modification, physical therapy, and conservative care.\par \par  NO NEW FINDINGS\par Patient denies new numbness, tingling, weakness, fevers, chills and bowel/bladder incontinence\par \par  \par I have consulted the  Registry for the purpose of reviewing the patient's controlled substance\par \par \par UTOX DONE

## 2023-02-03 NOTE — PHYSICAL EXAM
[de-identified] : PHYSICAL EXAM\par \par Constitutional: \par Appears well, no apparent distress\par Ability to communicate: Normal\par Respiratory: non-labored breathing\par Skin: no rash noted\par Head: normocephalic, atraumatic\par Neck: no visible thyroid enlargement\par Eyes: extraocular movements intact\par Neurologic: alert and oriented x3\par Psychiatric: normal mood, affect, and behavior\par \par Cervical:\par Palpation: left trapezial spasm, left trapezius tenderness and left paracervical tenderness\par ROM: Diminished range of motion in all plains.  Patient notes pain at extremes of extension and rotation to left.  Stiffness at extremes of extension and rotation to the left\par MMT: Motor exam is 5/5 through out bilateral upper extremities.\par Sensation: Light touch and pain is intact throughout bilateral upper extremities.\par Reflexes: No sustained clonus.\par Special Testing: Positive left Spurling test \par \par

## 2023-02-03 NOTE — HISTORY OF PRESENT ILLNESS
[Neck] : neck [Radiating] : radiating [Tingling] : tingling [Constant] : constant [Sleep] : sleep [Meds] : meds [Lying in bed] : lying in bed [de-identified] : pt is following up for neck pain , the pain goes into the left side he describes the pain going into the triceps and forearm  [] : no [FreeTextEntry6] : numbness [FreeTextEntry7] : left shoulder

## 2023-03-02 ENCOUNTER — APPOINTMENT (OUTPATIENT)
Dept: PAIN MANAGEMENT | Facility: CLINIC | Age: 48
End: 2023-03-02
Payer: COMMERCIAL

## 2023-03-02 VITALS — HEIGHT: 68 IN | WEIGHT: 150 LBS | BODY MASS INDEX: 22.73 KG/M2

## 2023-03-02 PROCEDURE — J3490M: CUSTOM

## 2023-03-02 PROCEDURE — 20552 NJX 1/MLT TRIGGER POINT 1/2: CPT

## 2023-03-02 PROCEDURE — 96372 THER/PROPH/DIAG INJ SC/IM: CPT

## 2023-03-02 PROCEDURE — 99214 OFFICE O/P EST MOD 30 MIN: CPT | Mod: 25

## 2023-03-02 RX ORDER — OXYCODONE HYDROCHLORIDE 20 MG/1
20 TABLET, FILM COATED, EXTENDED RELEASE ORAL
Qty: 30 | Refills: 0 | Status: ACTIVE | COMMUNITY
Start: 2023-02-03 | End: 1900-01-01

## 2023-03-02 NOTE — PHYSICAL EXAM
[de-identified] : PHYSICAL EXAM\par \par Constitutional: \par Appears well, no apparent distress\par Ability to communicate: Normal\par Respiratory: non-labored breathing\par Skin: no rash noted\par Head: normocephalic, atraumatic\par Neck: no visible thyroid enlargement\par Eyes: extraocular movements intact\par Neurologic: alert and oriented x3\par Psychiatric: normal mood, affect, and behavior\par \par Cervical:\par Palpation: left trapezial spasm, left trapezius tenderness and bilateral paracervical tenderness\par ROM: Diminished range of motion in all plains.  Patient notes pain at extremes of extension and rotation to left.  Stiffness at extremes of extension and rotation to the left\par MMT: Motor exam is 5/5 through out bilateral upper extremities.\par Sensation: Light touch and pain is intact throughout bilateral upper extremities.\par Reflexes: No sustained clonus.\par Special Testing: Positive left Spurling test \par \par

## 2023-03-02 NOTE — DISCUSSION/SUMMARY
[Medication Risks Reviewed] : Medication risks reviewed [de-identified] : TPI with toradol\par \par Medication Renewal\par \par The patient is stable on current pain medication with analgesia and without notable side effects or any obvious aberrant behaviors exhibited. \par There is clinically meaningful improvement in pain and function that outweighs risks to patient safety. I have discussed risks and realistic benefits of opioid therapy and patient and clinician responsibilities for managing therapy. Pain agreement has been signed. \par Will renew medication today.\par \par Follow up in 4-6 weeks\par Follow up in 4-6 weeks or sooner if there are any problems.\par \par Conservative Care\par Continue Home exercises, stretching, activity modification, physical therapy, and conservative care.\par \par  NO NEW FINDINGS\par Patient denies new numbness, tingling, weakness, fevers, chills and bowel/bladder incontinence\par \par  \par I have consulted the  Registry for the purpose of reviewing the patient's controlled substance\par \par \par UTOX DONE

## 2023-03-02 NOTE — HISTORY OF PRESENT ILLNESS
[Neck] : neck [9] : 9 [Radiating] : radiating [Shooting] : shooting [Stabbing] : stabbing [Constant] : constant [Meds] : meds [Standing] : standing [Walking] : walking [Bending forward] : bending forward [FreeTextEntry1] : pt is following up for neck pain , into the shoulders and arms  [] : no [FreeTextEntry7] : b/l shoulders and arms  [FreeTextEntry9] : TPI

## 2023-03-22 RX ORDER — METHYLPREDNISOLONE 4 MG/1
4 TABLET ORAL
Qty: 1 | Refills: 0 | Status: ACTIVE | COMMUNITY
Start: 2023-03-22 | End: 1900-01-01

## 2023-03-31 ENCOUNTER — APPOINTMENT (OUTPATIENT)
Dept: PAIN MANAGEMENT | Facility: CLINIC | Age: 48
End: 2023-03-31
Payer: COMMERCIAL

## 2023-03-31 VITALS — BODY MASS INDEX: 22.73 KG/M2 | HEIGHT: 68 IN | WEIGHT: 150 LBS

## 2023-03-31 PROCEDURE — 99213 OFFICE O/P EST LOW 20 MIN: CPT

## 2023-03-31 RX ORDER — DIAZEPAM 5 MG/1
5 TABLET ORAL
Qty: 60 | Refills: 0 | Status: ACTIVE | COMMUNITY
Start: 2023-03-31 | End: 1900-01-01

## 2023-03-31 NOTE — PHYSICAL EXAM
[de-identified] : PHYSICAL EXAM\par \par Constitutional: \par Appears well, no apparent distress\par Ability to communicate: Normal\par Respiratory: non-labored breathing\par Skin: no rash noted\par Head: normocephalic, atraumatic\par Neck: no visible thyroid enlargement\par Eyes: extraocular movements intact\par Neurologic: alert and oriented x3\par Psychiatric: normal mood, affect, and behavior\par \par Cervical:\par Palpation: left trapezial spasm, left trapezius tenderness and bilateral paracervical tenderness\par ROM: Diminished range of motion in all plains.  Patient notes pain at extremes of extension and rotation to left.  Stiffness at extremes of extension and rotation to the left\par MMT: Motor exam is 5/5 through out bilateral upper extremities.\par Sensation: Light touch and pain is intact throughout bilateral upper extremities.\par Reflexes: No sustained clonus.\par Special Testing: Positive left Spurling test \par \par

## 2023-03-31 NOTE — HISTORY OF PRESENT ILLNESS
[Neck] : neck [8] : 8 [7] : 7 [Burning] : burning [Radiating] : radiating [Sharp] : sharp [Shooting] : shooting [Stabbing] : stabbing [Throbbing] : throbbing [Tightness] : tightness [Tingling] : tingling [Constant] : constant [Sleep] : sleep [Meds] : meds [Lying in bed] : lying in bed [FreeTextEntry1] : pt is following up for neck pain , the pain goes into the left arm into the fingers  [] : no [FreeTextEntry6] : numbness

## 2023-04-28 ENCOUNTER — APPOINTMENT (OUTPATIENT)
Dept: PAIN MANAGEMENT | Facility: CLINIC | Age: 48
End: 2023-04-28
Payer: COMMERCIAL

## 2023-04-28 VITALS — WEIGHT: 151 LBS | HEIGHT: 68 IN | BODY MASS INDEX: 22.88 KG/M2

## 2023-04-28 PROCEDURE — 99214 OFFICE O/P EST MOD 30 MIN: CPT

## 2023-04-28 NOTE — DISCUSSION/SUMMARY
[Medication Risks Reviewed] : Medication risks reviewed [Surgical risks reviewed] : Surgical risks reviewed [de-identified] : proceed SCS\par After discussing various treatment options with the patient including but not limited to oral medications, physical therapy, exercise,\par modalities as well as interventional spinal injections, we have decided with the following plan\par \par I personally reviewed the MRI/CT scan images and agree with the radiologist's report. The\par radiological findings were discussed with the patient.\par \par \par The risks, benefits, contents and alternatives to injection were explained in full to the patient. Risks outlined include but are not\par limited to infection,sepsis, bleeding, post-dural puncture headache, nerve damage, temporary increase in pain, syncopal episode,\par failure to resolve symptoms, allergic reaction, symptom recurrence, and elevation of blood sugar in diabetics. Cortisone may cause\par immunosuppression. Patient understands the risks. All questions were answered. After discussion of options, patient requested\par an injection. Information regarding the injection was given to the patient. Which medications to stop prior to the injection was\par explained to the patient as well.\par \par Follow up in 1-2 weeks post injection for re-evaluation.\par \par  Conservative Care\par Continue Home exercises, stretching, activity modification, physical therapy, and conservative care.\par \par \par Medication Renewal\par \par The patient is stable on current pain medication with analgesia and without notable side effects or any obvious aberrant behaviors exhibited. \par There is clinically meaningful improvement in pain and function that outweighs risks to patient safety. I have discussed risks and realistic benefits of opioid therapy and patient and clinician responsibilities for managing therapy. Pain agreement has been signed. \par Will renew medication today.\par \par Follow up in 4-6 weeks\par Follow up in 4-6 weeks or sooner if there are any problems.\par \par Conservative Care\par Continue Home exercises, stretching, activity modification, physical therapy, and conservative care.\par \par  NO NEW FINDINGS\par Patient denies new numbness, tingling, weakness, fevers, chills and bowel/bladder incontinence\par \par  \par I have consulted the  Registry for the purpose of reviewing the patient's controlled substance\par \par

## 2023-04-28 NOTE — PHYSICAL EXAM
[de-identified] : PHYSICAL EXAM\par \par Constitutional: \par Appears well, no apparent distress\par Ability to communicate: Normal\par Respiratory: non-labored breathing\par Skin: no rash noted\par Head: normocephalic, atraumatic\par Neck: no visible thyroid enlargement\par Eyes: extraocular movements intact\par Neurologic: alert and oriented x3\par Psychiatric: normal mood, affect, and behavior\par \par Cervical:\par Palpation: left trapezial spasm, left trapezius tenderness and bilateral paracervical tenderness\par ROM: Diminished range of motion in all plains.  Patient notes pain at extremes of extension and rotation to left.  Stiffness at extremes of extension and rotation to the left\par MMT: Motor exam is 5/5 through out bilateral upper extremities.\par Sensation: Light touch and pain is intact throughout bilateral upper extremities.\par Reflexes: No sustained clonus.\par Special Testing: Positive left Spurling test \par \par

## 2023-05-25 ENCOUNTER — APPOINTMENT (OUTPATIENT)
Dept: PAIN MANAGEMENT | Facility: CLINIC | Age: 48
End: 2023-05-25
Payer: COMMERCIAL

## 2023-05-25 VITALS — HEIGHT: 68 IN | BODY MASS INDEX: 22.88 KG/M2 | WEIGHT: 151 LBS

## 2023-05-25 PROCEDURE — 99213 OFFICE O/P EST LOW 20 MIN: CPT

## 2023-05-25 RX ORDER — TRAMADOL HYDROCHLORIDE 50 MG/1
50 TABLET, COATED ORAL DAILY
Qty: 60 | Refills: 0 | Status: ACTIVE | COMMUNITY
Start: 2023-02-03 | End: 1900-01-01

## 2023-05-31 NOTE — PHYSICAL EXAM
[de-identified] : PHYSICAL EXAM\par \par Constitutional: \par Appears well, no apparent distress\par Ability to communicate: Normal\par Respiratory: non-labored breathing\par Skin: no rash noted\par Head: normocephalic, atraumatic\par Neck: no visible thyroid enlargement\par Eyes: extraocular movements intact\par Neurologic: alert and oriented x3\par Psychiatric: normal mood, affect, and behavior\par \par Cervical:\par Palpation: left trapezial spasm, left trapezius tenderness and bilateral paracervical tenderness\par ROM: Diminished range of motion in all plains.  Patient notes pain at extremes of extension and rotation to left.  Stiffness at extremes of extension and rotation to the left\par MMT: Motor exam is 5/5 through out bilateral upper extremities.\par Sensation: Light touch and pain is intact throughout bilateral upper extremities.\par Reflexes: No sustained clonus.\par Special Testing: Positive left Spurling test \par \par

## 2023-05-31 NOTE — ASSESSMENT
[FreeTextEntry1] : Medication Renewal\par \par The patient is stable on current pain medication with analgesia and without notable side effects or any obvious aberrant behaviors exhibited. \par There is clinically meaningful improvement in pain and function that outweighs risks to patient safety. I have discussed risks and realistic benefits of opioid therapy and patient and clinician responsibilities for managing therapy. Pain agreement has been signed. \par Will renew medication today.\par \par Follow up in 4-6 weeks or sooner if there are any problems.\par \par Conservative Care\par Continue Home exercises, stretching, activity modification, physical therapy, and conservative care.\par \par  NO NEW FINDINGS\par Patient denies new numbness, tingling, weakness, fevers, chills and bowel/bladder incontinence\par \par  \par I have consulted the  Registry for the purpose of reviewing the patient's controlled substance\par \par

## 2023-05-31 NOTE — HISTORY OF PRESENT ILLNESS
[Neck] : neck [8] : 8 [7] : 7 [Burning] : burning [Radiating] : radiating [Sharp] : sharp [Shooting] : shooting [Stabbing] : stabbing [Throbbing] : throbbing [Tightness] : tightness [Tingling] : tingling [Sleep] : sleep [Constant] : constant [Meds] : meds [Lying in bed] : lying in bed [FreeTextEntry1] : pt is following up for neck pain , the pain goes into the left arm into the fingers  [] : no [FreeTextEntry6] : numbness

## 2023-05-31 NOTE — ADDENDUM
[FreeTextEntry1] : Pt s/p 2 level disc replacement 4/*13/20 with subsequent Anterior and posterior revision fusion surgery 10/28/2020. Subsequently pt herniated C3-4 C4-5 discs which required an anterior fusion in January 2022.  He then needed posterior revision fusion surgery 3/2022.  There had been a screw that the surgeon had left which was near his spinal cord which had to be removed for his 5th surgery 6/2022. Due to multiple surgeries and a screw that was abutting his spinal cord he developed chronic left sided nerve pain which was consistent with CRPS.  HE suffers from allodynia, hyperesthesias, intermittent erythema and temperature changes. He is on escalating doses of opiates with less efficacy and he is unable to sleep at night.  He has intermittent numbness in all 5 fingers of L hand.  He would benefit from SCS spinal cord stimulation.

## 2023-06-06 ENCOUNTER — APPOINTMENT (OUTPATIENT)
Dept: ORTHOPEDIC SURGERY | Facility: CLINIC | Age: 48
End: 2023-06-06
Payer: COMMERCIAL

## 2023-06-06 VITALS — BODY MASS INDEX: 22.88 KG/M2 | WEIGHT: 151 LBS | HEIGHT: 68 IN

## 2023-06-06 DIAGNOSIS — S83.231A COMPLEX TEAR OF MEDIAL MENISCUS, CURRENT INJURY, RIGHT KNEE, INITIAL ENCOUNTER: ICD-10-CM

## 2023-06-06 PROCEDURE — 99203 OFFICE O/P NEW LOW 30 MIN: CPT

## 2023-06-06 PROCEDURE — 73564 X-RAY EXAM KNEE 4 OR MORE: CPT | Mod: RT

## 2023-06-08 ENCOUNTER — APPOINTMENT (OUTPATIENT)
Dept: PAIN MANAGEMENT | Facility: CLINIC | Age: 48
End: 2023-06-08

## 2023-06-08 ENCOUNTER — APPOINTMENT (OUTPATIENT)
Dept: PAIN MANAGEMENT | Facility: CLINIC | Age: 48
End: 2023-06-08
Payer: COMMERCIAL

## 2023-06-08 PROCEDURE — 63650Z: CUSTOM

## 2023-06-08 PROCEDURE — L8680: CPT

## 2023-06-08 RX ORDER — CEPHALEXIN 500 MG/1
500 CAPSULE ORAL 3 TIMES DAILY
Qty: 21 | Refills: 0 | Status: ACTIVE | COMMUNITY
Start: 2023-06-08 | End: 1900-01-01

## 2023-06-08 NOTE — PROCEDURE
[FreeTextEntry3] : Date of Service: 06/08/2023 \par \par Account: [id number]\par \par Patient: LEONOR RODRIGUEZ \par \par YOB: 1975\par \par Age: 48 year\par \par \par Surgeon:                               Ramiro Boles M.D.\par \par Pre-Operative Diagnosis:  Lumbosacral Radiculitis\par \par Post Operative Diagnosis:  Lumbosacral Radiculitis\par \par \par \par Procedure:                            Dual  Octrode lead spinal cord stimulator trial (Nevro)\par \par Anesthesia:                           Monitor anesthetic care. Local with sedation and nasal oxygen; complications, none\par \par \par This procedure was carried out using fluoroscopic guidance.  The risks and benefits of the procedure were discussed extensively with the patient including the potential for nerve root irritation or injury as well as possibility of disc space infection.  The patient was given the opportunity for all questions to be answered. The consent of the patient was obtained and the following procedure was performed: \par \par The patient was placed on table in prone position with lumbar support and the area of the thoracic and lumbar spine was prepped and draped in a sterile fashion x3.  Under AP view the  L1-2 interspace was centered and the area just medial to the pedicle on the left and right side at l3-4 were identified and marked. The skin was injected with 1.5% Lidocaine and to which a NEVRO Tuohy needle was advanced using depth direction depth technique until ligament was contacted bilaterally. Under lateral view the needle was advanced until loss was achieved into the epidural space. After this  two  curved single Octrode Nevro lead with 8 contacts were advanced using depth direction depth technique under constant fluoroscopic visualization until one lead was placed to the superior portion of T8 vertebral body  on midline and the second lead was placed at the mid portion of the T9 body on midline.  \par \par A nevro stimulator was then affixed to the needles and impedance testing performed and was acceptable. After this the styllete was then removed. The needles were removed under constant fluoroscopic visualization. The leads were then affixed to the the skin using  Tegaderm and gauze. Bacitracin was placed at the entry point of the lead. The patient tolerated the procedure without complications. She was met with the Nevro representative in the recovery area to go over several programs for stimulation. Patient is to take p.o. antibiotics postoperatively for 1 weeks time prophylactically and follow-up in my office in approximately 1 week to evaluate efficacy of 1 week spinal cord stimulator trial and possible consideration of permanent implantation.     \par \par The needles were then removed and pressure was applied.  Anesthesia personnel were present throughout the procedure, monitoring vitals which were stable throughout.\par \par Upon completion of the procedure and after medical stabilization the patient was discharged home with instructions.\par \par   \par Ramiro Boles M.D.\par \par

## 2023-06-12 ENCOUNTER — RX RENEWAL (OUTPATIENT)
Age: 48
End: 2023-06-12

## 2023-06-12 PROBLEM — S83.231A COMPLEX TEAR OF MEDIAL MENISCUS OF RIGHT KNEE AS CURRENT INJURY, INITIAL ENCOUNTER: Status: ACTIVE | Noted: 2023-06-06

## 2023-06-12 NOTE — HISTORY OF PRESENT ILLNESS
[de-identified] : New patient is here for right knee. Pt previously had ACL recon with hamstring graft about 22 years ago, 2001. In the last year pt has been experiencing locking and clicking in the right knee which has gotten more prominent in the last week, no specific injury to the right knee. Pt notes little pain in the right knee. Pt has been seen by Dr. Boles for C-spine issues and has had stimulators. Pt takes pain meds for C-spine pain. Pt has not been seen for right knee and never had injections in the right knee. Pain in right knee radiates down right leg. Pt has been wearing sleeve on the right knee which helps a little with right knee.

## 2023-06-12 NOTE — DISCUSSION/SUMMARY
[Medication Risks Reviewed] : Medication risks reviewed [Surgical risks reviewed] : Surgical risks reviewed [de-identified] : \par \par X-Ray right knee reveals evidence of previous ACL recon with hamstring graft and associated chondral loss. \par \par Sx on ACL 22 years ago, 2001. \par \par Due to worsening pain and instability with mechanical symptoms, recommend the patient obtain MRI right knee to rule out  ACL re-tear s/p repair  - R/O meniscus tear, loosening of screws, ACL failure. Follow up after MRI to possibly rule out surgical pathology and discuss future treatment options. \par \par The patient has failed all previous conservative treatment modalities including but not limited to over 6 weeks of home exercises, stretching, activity modifications, RICE, oral antiinflammatories, etc. \par \par Modify activity as discussed. \par Will discuss further treatment options after review of MRI possible injections after MRI review. \par discussed use of mobic but on chronic high dose narcotics and may have interaction , discussed case with feliberto

## 2023-06-12 NOTE — PHYSICAL EXAM
[NL (0)] : extension 0 degrees [5___] : hamstring 5[unfilled]/5 [Equivocal] : equivocal anterior draw [Right] : right knee [All Views] : anteroposterior, lateral, skyline, and anteroposterior standing [] : non-antalgic [de-identified] : E [FreeTextEntry9] : X-Ray right knee reveals evidence of previous ACL recon with hamstring graft and associated chondral loss.  [TWNoteComboBox7] : flexion 135 degrees

## 2023-06-14 ENCOUNTER — APPOINTMENT (OUTPATIENT)
Dept: PAIN MANAGEMENT | Facility: CLINIC | Age: 48
End: 2023-06-14
Payer: COMMERCIAL

## 2023-06-14 VITALS — BODY MASS INDEX: 22.88 KG/M2 | HEIGHT: 68 IN | WEIGHT: 151 LBS

## 2023-06-14 PROCEDURE — 99213 OFFICE O/P EST LOW 20 MIN: CPT

## 2023-06-14 NOTE — PHYSICAL EXAM

## 2023-06-14 NOTE — DISCUSSION/SUMMARY
[de-identified] : Leads removed.   \par \par Medication Renewal\par \par The patient is stable on current pain medication with analgesia and without notable side effects or any obvious aberrant behaviors exhibited. \par There is clinically meaningful improvement in pain and function that outweighs risks to patient safety. I have discussed risks and realistic benefits of opioid therapy and patient and clinician responsibilities for managing therapy. Pain agreement has been signed. \par Will renew medication today.\par \par Follow up in 4-6 weeks or sooner if there are any problems.\par \par Conservative Care\par Continue Home exercises, stretching, activity modification, physical therapy, and conservative care.\par \par  NO NEW FINDINGS\par Patient denies new numbness, tingling, weakness, fevers, chills and bowel/bladder incontinence\par \par  \par I have consulted the  Registry for the purpose of reviewing the patient's controlled substance\par \par \par

## 2023-06-14 NOTE — REASON FOR VISIT
[Follow-Up Visit] : a follow-up pain management visit [FreeTextEntry2] : DUAL OCTRODE LEAD SPINAL CORD STIMULATOR TRIAL-DOS 06/08/23-[facility]

## 2023-06-14 NOTE — HISTORY OF PRESENT ILLNESS
[Neck] : neck [8] : 8 [7] : 7 [Burning] : burning [Radiating] : radiating [Sharp] : sharp [Shooting] : shooting [Stabbing] : stabbing [Throbbing] : throbbing [Tingling] : tingling [Tightness] : tightness [Constant] : constant [Sleep] : sleep [Meds] : meds [Lying in bed] : lying in bed [FreeTextEntry1] : pt is following up after procedure lead pull  [] : no [FreeTextEntry6] : numbness

## 2023-06-15 ENCOUNTER — FORM ENCOUNTER (OUTPATIENT)
Age: 48
End: 2023-06-15

## 2023-06-16 ENCOUNTER — APPOINTMENT (OUTPATIENT)
Dept: MRI IMAGING | Facility: CLINIC | Age: 48
End: 2023-06-16
Payer: COMMERCIAL

## 2023-06-16 PROCEDURE — 73721 MRI JNT OF LWR EXTRE W/O DYE: CPT | Mod: RT

## 2023-06-27 ENCOUNTER — APPOINTMENT (OUTPATIENT)
Dept: ORTHOPEDIC SURGERY | Facility: CLINIC | Age: 48
End: 2023-06-27
Payer: COMMERCIAL

## 2023-06-27 VITALS — HEIGHT: 68 IN | BODY MASS INDEX: 22.88 KG/M2 | WEIGHT: 151 LBS

## 2023-06-27 PROCEDURE — L1833: CPT | Mod: RT

## 2023-06-27 PROCEDURE — 99214 OFFICE O/P EST MOD 30 MIN: CPT | Mod: 25

## 2023-06-27 PROCEDURE — J3490M: CUSTOM

## 2023-06-27 PROCEDURE — 20611 DRAIN/INJ JOINT/BURSA W/US: CPT | Mod: RT

## 2023-06-30 ENCOUNTER — APPOINTMENT (OUTPATIENT)
Dept: PAIN MANAGEMENT | Facility: CLINIC | Age: 48
End: 2023-06-30
Payer: COMMERCIAL

## 2023-06-30 VITALS — WEIGHT: 151 LBS | BODY MASS INDEX: 22.88 KG/M2 | HEIGHT: 68 IN

## 2023-06-30 PROCEDURE — J3490M: CUSTOM

## 2023-06-30 PROCEDURE — 99214 OFFICE O/P EST MOD 30 MIN: CPT | Mod: 25

## 2023-06-30 PROCEDURE — 20552 NJX 1/MLT TRIGGER POINT 1/2: CPT

## 2023-06-30 NOTE — HISTORY OF PRESENT ILLNESS
[8] : 8 [7] : 7 [Radiating] : radiating [Sharp] : sharp [Shooting] : shooting [Stabbing] : stabbing [Throbbing] : throbbing [Constant] : constant [Sleep] : sleep [Meds] : meds [Lying in bed] : lying in bed [Lower back] : lower back [FreeTextEntry1] : pt states he is having pain in the lower back , the pain radiates into the left side into the right buttocks , behind the thigh area and by the right hamstring  [] : no [FreeTextEntry6] : numbness [FreeTextEntry7] : left side

## 2023-06-30 NOTE — PHYSICAL EXAM
[de-identified] : PHYSICAL EXAM\par \par Constitutional: \par Appears well, no apparent distress\par Ability to communicate: Normal\par Respiratory: non-labored breathing\par Skin: no rash noted\par Head: normocephalic, atraumatic\par Neck: no visible thyroid enlargement\par Eyes: extraocular movements intact\par Neurologic: alert and oriented x3\par Psychiatric: normal mood, affect, and behavior\par \par Lumbar Spine: \par Palpation: left lumbar paraspinal spasm and left lumbar paraspinal tenderness to palpation.\par ROM: Diminished range of motion in all plains.  Patient notes pain with lateral bending to the left.\par MMT: Motor exam is 5/5 through out bilateral lower extremities.\par Sensation: Light touch and pain is intact throughout bilateral lower extremities.\par Reflexes: achilles and patella reflexes are intact and  symmetrical.  No sustained clonus.\par Special Testing: Positive straight leg raise on the left side.\par \par Assessemnt:\par Radiculopathy of lumbosacral region (M54.17)\par Myalgia (M79.10)\par \par

## 2023-07-04 NOTE — PHYSICAL EXAM
[Right] : right knee [NL (0)] : extension 0 degrees [5___] : hamstring 5[unfilled]/5 [Equivocal] : equivocal anterior draw [] : non-antalgic [TWNoteComboBox7] : flexion 135 degrees

## 2023-07-04 NOTE — HISTORY OF PRESENT ILLNESS
[de-identified] : Here for follow up on the right knee MRI results today. Still having some of the clicking and catching in the knee. No increased pain since the last visit.

## 2023-07-04 NOTE — DATA REVIEWED
[MRI] : MRI [Right] : of the right [Knee] : knee [Report was reviewed and noted in the chart] : The report was reviewed and noted in the chart [I independently reviewed and interpreted images and report] : I independently reviewed and interpreted images and report [I reviewed the films/CD] : I reviewed the films/CD [FreeTextEntry1] : MRI right knee reveals evidence of soft tissue ganglion in the posterior medial inferior aspect of the knee, no recurrent ACL tear, chondral loss in the lateral compartment, proximal tibial screw - no evidence of loosening.

## 2023-07-04 NOTE — PROCEDURE
[Large Joint Injection] : Large joint injection [Right] : of the right [Knee] : knee [Inflammation] : inflammation [X-ray evidence of Osteoarthritis on this or prior visit] : x-ray evidence of Osteoarthritis on this or prior visit [Repeat series performed] : repeat series performed [___ cc    3mg] :  Betamethasone (Celestone) ~Vcc of 3mg [___ cc    0.25%] : Bupivacaine (Marcaine) ~Vcc of 0.25%  [Call if redness, pain or fever occur] : call if redness, pain or fever occur [Apply ice for 15min out of every hour for the next 12-24 hours as tolerated] : apply ice for 15 minutes out of every hour for the next 12-24 hours as tolerated [Patient was advised to rest the joint(s) for ____ days] : patient was advised to rest the joint(s) for [unfilled] days [Previous OTC use and PT nontherapeutic] : patient has tried OTC's including aspirin, Ibuprofen, Aleve, etc or prescription NSAIDS, and/or exercises at home and/or physical therapy without satisfactory response [Risks, benefits, alternatives discussed / Verbal consent obtained] : the risks benefits, and alternatives have been discussed, and verbal consent was obtained [Prior failure or difficult injection] : prior failure or difficult injection [All ultrasound images have been permanently captured and stored accordingly in our picture archiving and communication system] : All ultrasound images have been permanently captured and stored accordingly in our picture archiving and communication system [Visualization of the needle and placement of injection was performed without complication] : visualization of the needle and placement of injection was performed without complication [Pain] : pain [FreeTextEntry1] : RIGHT KNEE 9/2 CSI UNDER US GUIDANCE  [FreeTextEntry3] : Large joint injection was performed of the right knee. An injection of Bupivacaine (Marcaine) cc of 0.5% was used Betamethasone (Celestone) cc of 6mg. \par Patient was advised to call if redness, pain or fever occur and apply ice for 15 minutes out of every hour for the next 12-24 hours as tolerated. \par \par Patient has tried OTC's including aspirin, Ibuprofen, Aleve, etc or prescription NSAIDS, and/or exercises at home and/or physical therapy without satisfactory response and the risks benefits, and alternatives have been discussed, and verbal consent was obtained. \par Ultrasound guidance was indicated for this patient due to precise injection in area of tear. All ultrasound images have been permanently captured and stored accordingly in our picture archiving and communication system. Visualization of the needle and placement of injection was performed without complication. \par The findings showed no evidence of ligament, tendon or muscle tear and no effusion or other fluid collection.

## 2023-07-04 NOTE — DISCUSSION/SUMMARY
[Medication Risks Reviewed] : Medication risks reviewed [Surgical risks reviewed] : Surgical risks reviewed [de-identified] : \par \par MRI right knee reveals evidence of soft tissue ganglion in the posterior medial inferior aspect of the knee, no recurrent ACL tear, chondral loss in the lateral compartment, proximal tibial screw - no evidence of loosening. \par \par Previous right knee ACL Sx on ACL 22 years ago, 2001. \par \par We reviewed the mri findings and discussed treatment options, both operative and non operative. Discussed risks of potential surgery. However, due to the risks of the surgery, we will try NSAIDs and therapy. Discussed management of medication. \par \par The patient will begin use of Playmaker knee brace to ensure stability and avoid any recurrent instability/buckling events - fitted and dispensed in office today. \par \par Discussed treatment options in the form of injection therapy. Patient elected to receive RIGHT KNEE 9/2 CSI  under ultrasound guidance. Advised patient to rest and ice the area. \par The risks, benefits and contents of the injection have been discussed. Risks include but are not limited to allergic reaction, flare reaction, permanent white skin discoloration at the injection site and infection.  The patient understands the risks and agrees to having the injection.  All questions have been answered.\par Discussed the timing of the injections and the follow up that is needed. Advised the patient to ice the area that was injected and that it may take a few days before experiencing relief.\par \par Continue physical therapy \par Follow up 3-4 weeks \par 45 minute face to face\par \par \par

## 2023-07-06 ENCOUNTER — FORM ENCOUNTER (OUTPATIENT)
Age: 48
End: 2023-07-06

## 2023-07-07 ENCOUNTER — APPOINTMENT (OUTPATIENT)
Dept: MRI IMAGING | Facility: CLINIC | Age: 48
End: 2023-07-07
Payer: COMMERCIAL

## 2023-07-07 PROCEDURE — 72148 MRI LUMBAR SPINE W/O DYE: CPT

## 2023-07-11 ENCOUNTER — APPOINTMENT (OUTPATIENT)
Dept: ORTHOPEDIC SURGERY | Facility: CLINIC | Age: 48
End: 2023-07-11
Payer: COMMERCIAL

## 2023-07-11 VITALS — WEIGHT: 151 LBS | HEIGHT: 68 IN | BODY MASS INDEX: 22.88 KG/M2

## 2023-07-11 DIAGNOSIS — M23.91 UNSPECIFIED INTERNAL DERANGEMENT OF RIGHT KNEE: ICD-10-CM

## 2023-07-11 DIAGNOSIS — M54.17 RADICULOPATHY, LUMBOSACRAL REGION: ICD-10-CM

## 2023-07-11 DIAGNOSIS — M17.11 UNILATERAL PRIMARY OSTEOARTHRITIS, RIGHT KNEE: ICD-10-CM

## 2023-07-11 PROCEDURE — 99214 OFFICE O/P EST MOD 30 MIN: CPT

## 2023-07-11 PROCEDURE — 99213 OFFICE O/P EST LOW 20 MIN: CPT

## 2023-07-14 ENCOUNTER — RX RENEWAL (OUTPATIENT)
Age: 48
End: 2023-07-14

## 2023-07-14 PROBLEM — M23.91 INTERNAL DERANGEMENT OF RIGHT KNEE: Status: ACTIVE | Noted: 2023-06-07

## 2023-07-14 PROBLEM — M54.17 LUMBOSACRAL RADICULITIS: Status: ACTIVE | Noted: 2023-06-30

## 2023-07-14 NOTE — PHYSICAL EXAM
[Right] : right knee [NL (0)] : extension 0 degrees [Equivocal] : equivocal anterior draw [5___] : quadriceps 5[unfilled]/5 [] : non-antalgic [TWNoteComboBox7] : flexion 135 degrees

## 2023-07-14 NOTE — DISCUSSION/SUMMARY
[Medication Risks Reviewed] : Medication risks reviewed [Surgical risks reviewed] : Surgical risks reviewed [de-identified] : \par \par Significant improvement from previous right knee CSI on 6/27/23. \par \par Previous right knee ACL Sx on ACL 22 years ago, 2001. \par Discussed risks of potential surgery. However, due to the risks of the surgery, we will try NSAIDs and therapy. Discussed management of medication.\par The risks and benefits of surgery have been discussed. Risks include but are not limited to bleeding, infection, reaction to anesthesia, injury to blood vessels and nerves, malunion, nonunion, DVT, PE, necessity of repeat surgery, chronic pain, loss of limb and death. The patient understands the risks and has chosen to proceed with conservative care. All questions have been answered.\par \par Discussed possible treatment options operative vs non-operative however will hold off as the patient is doing well from previous injection. \par \par Continue strengthening to improve upon residual quad atrophy. \par \par Continue advancing activity as tolerated. \par 30 mins face to face\par \par \par

## 2023-07-14 NOTE — HISTORY OF PRESENT ILLNESS
[de-identified] : Patient is here to follow up on right knee. Celestone injection from 6/27/23 has been giving relief. Improvement with pain and constant clicking, but will still notice slight intermittently. Overall doing well.

## 2023-07-21 ENCOUNTER — APPOINTMENT (OUTPATIENT)
Dept: PAIN MANAGEMENT | Facility: CLINIC | Age: 48
End: 2023-07-21
Payer: COMMERCIAL

## 2023-07-21 VITALS — HEIGHT: 68 IN | BODY MASS INDEX: 22.73 KG/M2 | WEIGHT: 150 LBS

## 2023-07-21 PROCEDURE — 99213 OFFICE O/P EST LOW 20 MIN: CPT

## 2023-07-21 NOTE — HISTORY OF PRESENT ILLNESS
[Lower back] : lower back [8] : 8 [7] : 7 [Radiating] : radiating [Sharp] : sharp [Shooting] : shooting [Stabbing] : stabbing [Throbbing] : throbbing [Constant] : constant [Sleep] : sleep [Meds] : meds [Lying in bed] : lying in bed [FreeTextEntry1] : 07/21/2023\par MRI REVIEW , med refill \par \par  [] : no [FreeTextEntry6] : numbness [FreeTextEntry7] : left side

## 2023-07-21 NOTE — PHYSICAL EXAM

## 2023-07-21 NOTE — DISCUSSION/SUMMARY
[de-identified] : Medication Renewal\par \par The patient is stable on current pain medication with analgesia and without notable side effects or any obvious aberrant behaviors exhibited. \par There is clinically meaningful improvement in pain and function that outweighs risks to patient safety. I have discussed risks and realistic benefits of opioid therapy and patient and clinician responsibilities for managing therapy. Pain agreement has been signed. \par Will renew medication today.\par \par Follow up in 4-6 weeks or sooner if there are any problems.\par \par Conservative Care\par Continue Home exercises, stretching, activity modification, physical therapy, and conservative care.\par \par  NO NEW FINDINGS\par Patient denies new numbness, tingling, weakness, fevers, chills and bowel/bladder incontinence\par \par  \par I have consulted the  Registry for the purpose of reviewing the patient's controlled substance\par \par \par

## 2023-08-04 ENCOUNTER — APPOINTMENT (OUTPATIENT)
Dept: PAIN MANAGEMENT | Facility: CLINIC | Age: 48
End: 2023-08-04

## 2023-08-16 ENCOUNTER — APPOINTMENT (OUTPATIENT)
Dept: PAIN MANAGEMENT | Facility: CLINIC | Age: 48
End: 2023-08-16
Payer: COMMERCIAL

## 2023-08-16 VITALS — BODY MASS INDEX: 22.73 KG/M2 | HEIGHT: 68 IN | WEIGHT: 150 LBS

## 2023-08-16 PROCEDURE — 99213 OFFICE O/P EST LOW 20 MIN: CPT

## 2023-08-16 NOTE — PHYSICAL EXAM

## 2023-08-16 NOTE — DISCUSSION/SUMMARY
[Medication Risks Reviewed] : Medication risks reviewed [de-identified] : will change med regimen    Medication Renewal  The patient is stable on current pain medication with analgesia and without notable side effects or any obvious aberrant behaviors exhibited.  There is clinically meaningful improvement in pain and function that outweighs risks to patient safety. I have discussed risks and realistic benefits of opioid therapy and patient and clinician responsibilities for managing therapy. Pain agreement has been signed.  Will renew medication today.  Follow up in 4-6 weeks or sooner if there are any problems.  Conservative Care Continue Home exercises, stretching, activity modification, physical therapy, and conservative care.   NO NEW FINDINGS Patient denies new numbness, tingling, weakness, fevers, chills and bowel/bladder incontinence    I have consulted the  Registry for the purpose of reviewing the patient's controlled substance    Glycopyrrolate Pregnancy And Lactation Text: This medication is Pregnancy Category B and is considered safe during pregnancy. It is unknown if it is excreted breast milk.

## 2023-08-16 NOTE — HISTORY OF PRESENT ILLNESS
[Lower back] : lower back [8] : 8 [7] : 7 [Radiating] : radiating [Sharp] : sharp [Shooting] : shooting [Stabbing] : stabbing [Throbbing] : throbbing [Constant] : constant [Sleep] : sleep [Meds] : meds [Lying in bed] : lying in bed [FreeTextEntry1] : 08/16/23 Meds refill   07/21/2023 MRI REVIEW , med refill    [] : no [FreeTextEntry6] : numbness [FreeTextEntry7] : left side

## 2023-09-13 ENCOUNTER — APPOINTMENT (OUTPATIENT)
Dept: PAIN MANAGEMENT | Facility: CLINIC | Age: 48
End: 2023-09-13
Payer: COMMERCIAL

## 2023-09-13 PROCEDURE — 64483 NJX AA&/STRD TFRM EPI L/S 1: CPT | Mod: LT

## 2023-09-13 PROCEDURE — 64484 NJX AA&/STRD TFRM EPI L/S EA: CPT | Mod: 59,LT

## 2023-09-14 ENCOUNTER — APPOINTMENT (OUTPATIENT)
Dept: PAIN MANAGEMENT | Facility: CLINIC | Age: 48
End: 2023-09-14
Payer: COMMERCIAL

## 2023-09-14 VITALS — WEIGHT: 150 LBS | HEIGHT: 68 IN | BODY MASS INDEX: 22.73 KG/M2

## 2023-09-14 PROCEDURE — 99213 OFFICE O/P EST LOW 20 MIN: CPT

## 2023-09-14 RX ORDER — OXYCODONE AND ACETAMINOPHEN 10; 325 MG/1; MG/1
10-325 TABLET ORAL TWICE DAILY
Qty: 60 | Refills: 0 | Status: ACTIVE | COMMUNITY
Start: 2022-05-05 | End: 1900-01-01

## 2023-10-11 ENCOUNTER — APPOINTMENT (OUTPATIENT)
Dept: PAIN MANAGEMENT | Facility: CLINIC | Age: 48
End: 2023-10-11
Payer: COMMERCIAL

## 2023-10-11 VITALS — WEIGHT: 141 LBS | HEIGHT: 68 IN | BODY MASS INDEX: 21.37 KG/M2

## 2023-10-11 PROCEDURE — 99213 OFFICE O/P EST LOW 20 MIN: CPT

## 2023-10-12 RX ORDER — OXYCODONE AND ACETAMINOPHEN 10; 325 MG/1; MG/1
10-325 TABLET ORAL TWICE DAILY
Qty: 60 | Refills: 0 | Status: ACTIVE | COMMUNITY
Start: 2022-07-15 | End: 1900-01-01

## 2023-11-06 ENCOUNTER — APPOINTMENT (OUTPATIENT)
Dept: PAIN MANAGEMENT | Facility: CLINIC | Age: 48
End: 2023-11-06
Payer: COMMERCIAL

## 2023-11-06 VITALS — BODY MASS INDEX: 21.37 KG/M2 | WEIGHT: 141 LBS | HEIGHT: 68 IN

## 2023-11-06 PROCEDURE — 99213 OFFICE O/P EST LOW 20 MIN: CPT

## 2023-11-09 ENCOUNTER — APPOINTMENT (OUTPATIENT)
Dept: PAIN MANAGEMENT | Facility: CLINIC | Age: 48
End: 2023-11-09

## 2023-12-04 ENCOUNTER — APPOINTMENT (OUTPATIENT)
Dept: PAIN MANAGEMENT | Facility: CLINIC | Age: 48
End: 2023-12-04
Payer: COMMERCIAL

## 2023-12-04 PROCEDURE — 99213 OFFICE O/P EST LOW 20 MIN: CPT

## 2023-12-04 PROCEDURE — 99212 OFFICE O/P EST SF 10 MIN: CPT

## 2023-12-04 RX ORDER — NALOXONE HYDROCHLORIDE 4 MG/.1ML
4 SPRAY NASAL ONCE
Qty: 1 | Refills: 0 | Status: ACTIVE | COMMUNITY
Start: 2023-12-04 | End: 1900-01-01

## 2024-01-03 ENCOUNTER — RX RENEWAL (OUTPATIENT)
Age: 49
End: 2024-01-03

## 2024-01-03 ENCOUNTER — APPOINTMENT (OUTPATIENT)
Dept: PAIN MANAGEMENT | Facility: CLINIC | Age: 49
End: 2024-01-03
Payer: COMMERCIAL

## 2024-01-03 VITALS — HEIGHT: 68 IN | BODY MASS INDEX: 21.37 KG/M2 | WEIGHT: 141 LBS

## 2024-01-03 PROCEDURE — J3490M: CUSTOM

## 2024-01-03 PROCEDURE — 99213 OFFICE O/P EST LOW 20 MIN: CPT | Mod: 25

## 2024-01-03 PROCEDURE — 96372 THER/PROPH/DIAG INJ SC/IM: CPT | Mod: 59

## 2024-01-03 PROCEDURE — 20552 NJX 1/MLT TRIGGER POINT 1/2: CPT

## 2024-01-03 NOTE — DISCUSSION/SUMMARY
[Medication Risks Reviewed] : Medication risks reviewed [de-identified] : TPI with toradol  Medication Renewal  The patient is stable on current pain medication with analgesia and without notable side effects or any obvious aberrant behaviors exhibited.  There is clinically meaningful improvement in pain and function that outweighs risks to patient safety. I have discussed risks and realistic benefits of opioid therapy and patient and clinician responsibilities for managing therapy. Pain agreement has been signed.  Will renew medication today.  Follow up in 4-6 weeks or sooner if there are any problems.  Conservative Care Continue Home exercises, stretching, activity modification, physical therapy, and conservative care.   NO NEW FINDINGS Patient denies new numbness, tingling, weakness, fevers, chills and bowel/bladder incontinence    I have consulted the  Registry for the purpose of reviewing the patient's controlled substance

## 2024-01-03 NOTE — PHYSICAL EXAM
[de-identified] : PHYSICAL EXAM  Constitutional:  Appears well, no apparent distress Ability to communicate: Normal Respiratory: non-labored breathing Skin: no rash noted Head: normocephalic, atraumatic Neck: no visible thyroid enlargement Eyes: extraocular movements intact Neurologic: alert and oriented x3 Psychiatric: normal mood, affect, and behavior  Cervical: Palpation: left trapezial spasm, left trapezius tenderness and left paracervical tenderness ROM: Diminished range of motion in all plains.  Patient notes pain at extremes of extension and rotation to left.  Stiffness at extremes of extension and rotation to the left MMT: Motor exam is 5/5 through out bilateral upper extremities. Sensation: Light touch and pain is diminished lue Reflexes: No sustained clonus. Special Testing: Positive left Spurling test

## 2024-01-29 ENCOUNTER — APPOINTMENT (OUTPATIENT)
Dept: PAIN MANAGEMENT | Facility: CLINIC | Age: 49
End: 2024-01-29
Payer: COMMERCIAL

## 2024-01-29 VITALS — HEIGHT: 68 IN | BODY MASS INDEX: 21.37 KG/M2 | WEIGHT: 141 LBS

## 2024-01-29 PROCEDURE — 99213 OFFICE O/P EST LOW 20 MIN: CPT

## 2024-01-29 NOTE — HISTORY OF PRESENT ILLNESS
[FreeTextEntry1] : pt is following up for c spine pain , going into the shoulder into the left side, arm  [Neck] : neck [9] : 9 [Radiating] : radiating [Sharp] : sharp [Shooting] : shooting [Stabbing] : stabbing [Throbbing] : throbbing [Constant] : constant [Sleep] : sleep [Meds] : meds [Lying in bed] : lying in bed [] : no [FreeTextEntry7] : left shoulder pain, bottom of the neck

## 2024-01-29 NOTE — DISCUSSION/SUMMARY
[de-identified] : Medication Renewal  The patient is stable on current pain medication with analgesia and without notable side effects or any obvious aberrant behaviors exhibited.  There is clinically meaningful improvement in pain and function that outweighs risks to patient safety. I have discussed risks and realistic benefits of opioid therapy and patient and clinician responsibilities for managing therapy. Pain agreement has been signed.  Will renew medication today.  Follow up in 4-6 weeks or sooner if there are any problems.  Conservative Care Continue Home exercises, stretching, activity modification, physical therapy, and conservative care.   NO NEW FINDINGS Patient denies new numbness, tingling, weakness, fevers, chills and bowel/bladder incontinence    I have consulted the  Registry for the purpose of reviewing the patient's controlled substance

## 2024-01-29 NOTE — PHYSICAL EXAM
[de-identified] : PHYSICAL EXAM  Constitutional:  Appears well, no apparent distress Ability to communicate: Normal Respiratory: non-labored breathing Skin: no rash noted Head: normocephalic, atraumatic Neck: no visible thyroid enlargement Eyes: extraocular movements intact Neurologic: alert and oriented x3 Psychiatric: normal mood, affect, and behavior  Cervical: Palpation: left trapezial spasm, left trapezius tenderness and left paracervical tenderness ROM: Diminished range of motion in all plains.  Patient notes pain at extremes of extension and rotation to left.  Stiffness at extremes of extension and rotation to the left MMT: Motor exam is 5/5 through out bilateral upper extremities. Sensation: Light touch and pain is intact throughout bilateral upper extremities. Reflexes: No sustained clonus. Special Testing: Positive left Spurling test

## 2024-02-07 RX ORDER — OXYCODONE HYDROCHLORIDE 30 MG/1
30 TABLET, FILM COATED, EXTENDED RELEASE ORAL 4 TIMES DAILY
Qty: 114 | Refills: 0 | Status: ACTIVE | COMMUNITY
Start: 2023-08-16 | End: 1900-01-01

## 2024-02-26 ENCOUNTER — APPOINTMENT (OUTPATIENT)
Dept: PAIN MANAGEMENT | Facility: CLINIC | Age: 49
End: 2024-02-26
Payer: COMMERCIAL

## 2024-02-26 VITALS — BODY MASS INDEX: 21.37 KG/M2 | WEIGHT: 141 LBS | HEIGHT: 68 IN

## 2024-02-26 PROCEDURE — 99213 OFFICE O/P EST LOW 20 MIN: CPT

## 2024-02-26 NOTE — DISCUSSION/SUMMARY
[de-identified] : Medication Renewal  The patient is stable on current pain medication with analgesia and without notable side effects or any obvious aberrant behaviors exhibited.  There is clinically meaningful improvement in pain and function that outweighs risks to patient safety. I have discussed risks and realistic benefits of opioid therapy and patient and clinician responsibilities for managing therapy. Pain agreement has been signed.  Will renew medication today.  Follow up in 4-6 weeks or sooner if there are any problems.  Conservative Care Continue Home exercises, stretching, activity modification, physical therapy, and conservative care.   NO NEW FINDINGS Patient denies new numbness, tingling, weakness, fevers, chills and bowel/bladder incontinence       I have consulted the  Registry for the purpose of reviewing the patient's controlled substance

## 2024-02-26 NOTE — HISTORY OF PRESENT ILLNESS
[Neck] : neck [9] : 9 [8] : 8 [Radiating] : radiating [Sharp] : sharp [Shooting] : shooting [Stabbing] : stabbing [Throbbing] : throbbing [Constant] : constant [Sleep] : sleep [Meds] : meds [Lying in bed] : lying in bed [] : no [FreeTextEntry7] : left shoulder pain

## 2024-02-26 NOTE — PHYSICAL EXAM
[de-identified] : PHYSICAL EXAM  Constitutional:  Appears well, no apparent distress Ability to communicate: Normal Respiratory: non-labored breathing Skin: no rash noted Head: normocephalic, atraumatic Neck: no visible thyroid enlargement Eyes: extraocular movements intact Neurologic: alert and oriented x3 Psychiatric: normal mood, affect, and behavior  Cervical: Palpation: left trapezial spasm, left trapezius tenderness and left paracervical tenderness ROM: Diminished range of motion in all plains.  Patient notes pain at extremes of extension and rotation to left.  Stiffness at extremes of extension and rotation to the left MMT: Motor exam is 5/5 through out bilateral upper extremities. Sensation: Light touch and pain is intact throughout bilateral upper extremities. Reflexes: No sustained clonus. Special Testing: Positive left Spurling test

## 2024-03-01 RX ORDER — AZITHROMYCIN 250 MG/1
250 TABLET, FILM COATED ORAL
Qty: 6 | Refills: 0 | Status: ACTIVE | COMMUNITY
Start: 2024-03-01 | End: 1900-01-01

## 2024-03-25 ENCOUNTER — APPOINTMENT (OUTPATIENT)
Dept: PAIN MANAGEMENT | Facility: CLINIC | Age: 49
End: 2024-03-25
Payer: COMMERCIAL

## 2024-03-25 VITALS — HEIGHT: 68 IN | BODY MASS INDEX: 21.37 KG/M2 | WEIGHT: 141 LBS

## 2024-03-25 DIAGNOSIS — M54.17 RADICULOPATHY, LUMBOSACRAL REGION: ICD-10-CM

## 2024-03-25 DIAGNOSIS — M47.812 SPONDYLOSIS W/OUT MYELOPATHY OR RADICULOPATHY, CERVICAL REGION: ICD-10-CM

## 2024-03-25 PROCEDURE — 20553 NJX 1/MLT TRIGGER POINTS 3/>: CPT

## 2024-03-25 PROCEDURE — J3490M: CUSTOM

## 2024-03-25 PROCEDURE — 99214 OFFICE O/P EST MOD 30 MIN: CPT | Mod: 25

## 2024-03-25 RX ORDER — BACLOFEN 20 MG/1
20 TABLET ORAL
Qty: 60 | Refills: 2 | Status: ACTIVE | COMMUNITY
Start: 2022-10-03 | End: 1900-01-01

## 2024-03-25 NOTE — PHYSICAL EXAM
[de-identified] : PHYSICAL EXAM  Constitutional:  Appears well, no apparent distress Ability to communicate: Normal Respiratory: non-labored breathing Skin: no rash noted Head: normocephalic, atraumatic Neck: no visible thyroid enlargement Eyes: extraocular movements intact Neurologic: alert and oriented x3 Psychiatric: normal mood, affect, and behavior  Cervical: Palpation: left trapezial spasm, left trapezius tenderness and left paracervical tenderness ROM: Diminished range of motion in all plains.  Patient notes pain at extremes of extension and rotation to left.  Stiffness at extremes of extension and rotation to the left MMT: Motor exam is 5/5 through out bilateral upper extremities. Sensation: Light touch and pain is intact throughout bilateral upper extremities. Reflexes: No sustained clonus. Special Testing: Positive left Spurling test    [] : full ROM with mild stiffness

## 2024-03-25 NOTE — PROCEDURE
[Trigger point 3 or more muscle groups] : Trigger point 3 or more muscle groups [Bilateral] : bilaterally of the [Lumbar paraspinal muscle] : lumbar paraspinal muscle [Cervical paraspinal muscle] : cervical paraspinal muscle [Inflammation] : inflammation [Pain] : pain [Ethyl Chloride sprayed topically] : ethyl chloride sprayed topically [Alcohol] : alcohol [Sterile technique used] : sterile technique used [___ cc    1%] : Lidocaine ~Vcc of 1%  [___ cc    0.25%] : Bupivacaine (Marcaine) ~Vcc of 0.25%  [___ cc    10mg] : Triamcinolone (Kenalog) ~Vcc of 10 mg  [Call if redness, pain or fever occur] : call if redness, pain or fever occur [] : Patient tolerated procedure well [Risks, benefits, alternatives discussed / Verbal consent obtained] : the risks benefits, and alternatives have been discussed, and verbal consent was obtained

## 2024-04-22 ENCOUNTER — APPOINTMENT (OUTPATIENT)
Dept: PAIN MANAGEMENT | Facility: CLINIC | Age: 49
End: 2024-04-22
Payer: COMMERCIAL

## 2024-04-22 VITALS — HEIGHT: 68 IN | BODY MASS INDEX: 21.37 KG/M2 | WEIGHT: 141 LBS

## 2024-04-22 PROCEDURE — 99213 OFFICE O/P EST LOW 20 MIN: CPT

## 2024-04-22 NOTE — PHYSICAL EXAM
[] : full ROM with mild stiffness [de-identified] : PHYSICAL EXAM  Constitutional:  Appears well, no apparent distress Ability to communicate: Normal Respiratory: non-labored breathing Skin: no rash noted Head: normocephalic, atraumatic Neck: no visible thyroid enlargement Eyes: extraocular movements intact Neurologic: alert and oriented x3 Psychiatric: normal mood, affect, and behavior  Cervical: Palpation: left trapezial spasm, left trapezius tenderness and left paracervical tenderness ROM: Diminished range of motion in all plains.  Patient notes pain at extremes of extension and rotation to left.  Stiffness at extremes of extension and rotation to the left MMT: Motor exam is 5/5 through out bilateral upper extremities. Sensation: Light touch and pain is intact throughout bilateral upper extremities. Reflexes: No sustained clonus. Special Testing: Positive left Spurling test

## 2024-04-22 NOTE — DISCUSSION/SUMMARY
[de-identified] : Medication Renewal  The patient is stable on current pain medication with analgesia and without notable side effects or any obvious aberrant behaviors exhibited.  There is clinically meaningful improvement in pain and function that outweighs risks to patient safety. I have discussed risks and realistic benefits of opioid therapy and patient and clinician responsibilities for managing therapy. Pain agreement has been signed.  Will renew medication today.  Follow up in 4-6 weeks Follow up in 4-6 weeks or sooner if there are any problems.  Conservative Care Continue Home exercises, stretching, activity modification, physical therapy, and conservative care.   NO NEW FINDINGS Patient denies new numbness, tingling, weakness, fevers, chills and bowel/bladder incontinence    I have consulted the  Registry for the purpose of reviewing the patient's controlled substance   UTOX-- last dose today

## 2024-05-16 ENCOUNTER — APPOINTMENT (OUTPATIENT)
Dept: PAIN MANAGEMENT | Facility: CLINIC | Age: 49
End: 2024-05-16
Payer: COMMERCIAL

## 2024-05-16 VITALS — HEIGHT: 68 IN | BODY MASS INDEX: 22.58 KG/M2 | WEIGHT: 149 LBS

## 2024-05-16 DIAGNOSIS — Z98.1 ARTHRODESIS STATUS: ICD-10-CM

## 2024-05-16 PROCEDURE — 99212 OFFICE O/P EST SF 10 MIN: CPT

## 2024-05-16 RX ORDER — BACLOFEN 20 MG/1
20 TABLET ORAL
Qty: 60 | Refills: 0 | Status: ACTIVE | COMMUNITY
Start: 2023-02-03 | End: 1900-01-01

## 2024-05-16 NOTE — HISTORY OF PRESENT ILLNESS
[Neck] : neck [9] : 9 [8] : 8 [Radiating] : radiating [Sharp] : sharp [Shooting] : shooting [Stabbing] : stabbing [Throbbing] : throbbing [Constant] : constant [Sleep] : sleep [Meds] : meds [Lying in bed] : lying in bed [FreeTextEntry1] : pt is following up for neck pain  [] : This patient has had an injection before: no [FreeTextEntry7] : left shoulder pain

## 2024-05-16 NOTE — PHYSICAL EXAM
[de-identified] : PHYSICAL EXAM  Constitutional:  Appears well, no apparent distress Ability to communicate: Normal Respiratory: non-labored breathing Skin: no rash noted Head: normocephalic, atraumatic Neck: no visible thyroid enlargement Eyes: extraocular movements intact Neurologic: alert and oriented x3 Psychiatric: normal mood, affect, and behavior  Cervical: Palpation: left trapezial spasm, left trapezius tenderness and left paracervical tenderness ROM: Diminished range of motion in all plains.  Patient notes pain at extremes of extension and rotation to left.  Stiffness at extremes of extension and rotation to the left MMT: Motor exam is 5/5 through out bilateral upper extremities. Sensation: Light touch and pain is intact throughout bilateral upper extremities. Reflexes: No sustained clonus. Special Testing: Positive left Spurling test

## 2024-06-17 ENCOUNTER — APPOINTMENT (OUTPATIENT)
Dept: PAIN MANAGEMENT | Facility: CLINIC | Age: 49
End: 2024-06-17

## 2024-06-17 DIAGNOSIS — M54.2 CERVICALGIA: ICD-10-CM

## 2024-06-17 DIAGNOSIS — M79.18 MYALGIA, OTHER SITE: ICD-10-CM

## 2024-06-17 DIAGNOSIS — M54.12 RADICULOPATHY, CERVICAL REGION: ICD-10-CM

## 2024-06-17 DIAGNOSIS — G89.4 CHRONIC PAIN SYNDROME: ICD-10-CM

## 2024-06-17 PROCEDURE — J3490M: CUSTOM

## 2024-06-17 PROCEDURE — 20552 NJX 1/MLT TRIGGER POINT 1/2: CPT

## 2024-06-17 PROCEDURE — 99213 OFFICE O/P EST LOW 20 MIN: CPT | Mod: 25

## 2024-06-17 RX ORDER — GABAPENTIN 800 MG/1
800 TABLET, COATED ORAL 4 TIMES DAILY
Qty: 120 | Refills: 5 | Status: ACTIVE | COMMUNITY
Start: 2022-05-05 | End: 1900-01-01

## 2024-06-17 RX ORDER — OXYCODONE AND ACETAMINOPHEN 10; 325 MG/1; MG/1
10-325 TABLET ORAL
Qty: 90 | Refills: 0 | Status: ACTIVE | COMMUNITY
Start: 2023-02-03 | End: 1900-01-01

## 2024-06-18 RX ORDER — OXYCODONE HYDROCHLORIDE 30 MG/1
30 TABLET, FILM COATED, EXTENDED RELEASE ORAL
Qty: 60 | Refills: 0 | Status: ACTIVE | COMMUNITY
Start: 2023-03-31 | End: 1900-01-01

## 2024-06-18 NOTE — DISCUSSION/SUMMARY
[de-identified] : TPI with toradol  Medication Renewal  The patient is stable on current pain medication with analgesia and without notable side effects or any obvious aberrant behaviors exhibited.  There is clinically meaningful improvement in pain and function that outweighs risks to patient safety. I have discussed risks and realistic benefits of opioid therapy and patient and clinician responsibilities for managing therapy. Pain agreement has been signed.  Will renew medication today.  Follow up in 4-6 weeks Follow up in 4-6 weeks or sooner if there are any problems.  Conservative Care Continue Home exercises, stretching, activity modification, physical therapy, and conservative care.   NO NEW FINDINGS Patient denies new numbness, tingling, weakness, fevers, chills and bowel/bladder incontinence    I have consulted the  Registry for the purpose of reviewing the patient's controlled substance

## 2024-06-18 NOTE — PHYSICAL EXAM
[de-identified] : PHYSICAL EXAM  Constitutional:  Appears well, no apparent distress Ability to communicate: Normal Respiratory: non-labored breathing Skin: no rash noted Head: normocephalic, atraumatic Neck: no visible thyroid enlargement Eyes: extraocular movements intact Neurologic: alert and oriented x3 Psychiatric: normal mood, affect, and behavior  Cervical: Palpation: left trapezial spasm, left trapezius tenderness and left paracervical tenderness ROM: Diminished range of motion in all plains.  Patient notes pain at extremes of extension and rotation to left.  Stiffness at extremes of extension and rotation to the left MMT: Motor exam is 5/5 through out bilateral upper extremities. Sensation: Light touch and pain is intact throughout bilateral upper extremities. Reflexes: No sustained clonus. Special Testing: Positive left Spurling test

## 2024-07-17 ENCOUNTER — APPOINTMENT (OUTPATIENT)
Dept: PAIN MANAGEMENT | Facility: CLINIC | Age: 49
End: 2024-07-17
Payer: COMMERCIAL

## 2024-07-17 VITALS — BODY MASS INDEX: 22.58 KG/M2 | WEIGHT: 149 LBS | HEIGHT: 68 IN

## 2024-07-17 DIAGNOSIS — G89.4 CHRONIC PAIN SYNDROME: ICD-10-CM

## 2024-07-17 DIAGNOSIS — M54.12 RADICULOPATHY, CERVICAL REGION: ICD-10-CM

## 2024-07-17 DIAGNOSIS — M54.2 CERVICALGIA: ICD-10-CM

## 2024-07-17 PROCEDURE — 99213 OFFICE O/P EST LOW 20 MIN: CPT

## 2024-07-29 ENCOUNTER — NON-APPOINTMENT (OUTPATIENT)
Age: 49
End: 2024-07-29

## 2024-07-29 ENCOUNTER — APPOINTMENT (OUTPATIENT)
Dept: PAIN MANAGEMENT | Facility: CLINIC | Age: 49
End: 2024-07-29

## 2024-07-29 PROCEDURE — 99213 OFFICE O/P EST LOW 20 MIN: CPT | Mod: 95

## 2024-07-29 PROCEDURE — ZZZZZ: CPT

## 2024-08-05 ENCOUNTER — APPOINTMENT (OUTPATIENT)
Dept: PAIN MANAGEMENT | Facility: CLINIC | Age: 49
End: 2024-08-05

## 2024-08-05 PROCEDURE — 64484 NJX AA&/STRD TFRM EPI L/S EA: CPT | Mod: 59,RT

## 2024-08-05 PROCEDURE — 64483 NJX AA&/STRD TFRM EPI L/S 1: CPT | Mod: RT

## 2024-08-05 NOTE — PROCEDURE
[FreeTextEntry3] : Date of Service: 08/05/2024   Account: [id number]   Patient: LEONOR RODRIGUEZ   YOB: 1975   Age: 49 year     Surgeon: Ramiro Boles M.D.   Assistant: None.   Pre-Operative Diagnosis: Lumbosacral radiculitis   Post Operative Diagnosis: Lumbosacral radiculitis   Procedure: Right L4-5, L5-S1 transforaminal epidural steroid injection under fluoroscopic guidance   Anesthesia: MAC      This procedure was carried out using fluoroscopic guidance.  The risks and benefits of the procedure were discussed extensively with the patient.  The consent of the patient was obtained and the following procedure was performed. The patient was placed in the prone position on the fluoroscopic table and the lumbar area was prepped and draped in a sterile fashion.   The right L4-5 and L5-S1 neural foramen were identified on right oblique  "jay jay dog" anatomical view at the 6 o' clock position using fluoroscopic guidance, and the area was marked. The overlying skin and subcutaneous structures were anesthetized using sterile technique with 1% Lidocaine.  A 22 gauge spinal needle was directed toward the inferior (6 o'clock) position of the pedicle, which formed the roof of the identified foramen.  Once in the epidural space, after negative aspiration for heme and CSF, 1cc of Omnipaque contrast was injected to confirm epidural location and assess filling defects and rule out intravascular needle placement.   The following contrast flow and epidurogram was observed: no intravascular or intrathecal flow pattern was noted.  No blood or CSF was aspirated. Omnipaque spread appeared to outline the right L4 and L5 nerve roots and spread medially into the epidural space.   After this, an injectate of 3 cc preservative free normal saline plus 40 mg of kenalog was injected in the epidural space at each of the two levels.   The needle was subsequently removed.  Vital signs remained normal.  Pulse oximeter was used throughout the procedure and the patient's pulse and oxygen saturation remained within normal limits.  The patient tolerated the procedure well.  There were no complications.  The patient was instructed to apply ice over the injection sites for twenty minutes every two hours for the next 24 to 48 hours.  The patient was also instructed to contact me immediately if there were any problems.     Ramiro Boles M.D.

## 2024-08-09 ENCOUNTER — APPOINTMENT (OUTPATIENT)
Dept: PAIN MANAGEMENT | Facility: CLINIC | Age: 49
End: 2024-08-09

## 2024-08-09 PROCEDURE — J3490M: CUSTOM

## 2024-08-09 PROCEDURE — 99213 OFFICE O/P EST LOW 20 MIN: CPT | Mod: 25

## 2024-08-09 PROCEDURE — 20552 NJX 1/MLT TRIGGER POINT 1/2: CPT

## 2024-08-09 NOTE — HISTORY OF PRESENT ILLNESS
[Neck] : neck [9] : 9 [8] : 8 [Radiating] : radiating [Sharp] : sharp [Shooting] : shooting [Stabbing] : stabbing [Throbbing] : throbbing [Constant] : constant [Sleep] : sleep [Meds] : meds [Lying in bed] : lying in bed [FreeTextEntry1] : pt is following up for neck pain  TPI-8/9/2024 [] : This patient has had an injection before: no [FreeTextEntry7] : left shoulder pain

## 2024-08-09 NOTE — PHYSICAL EXAM
[de-identified] : PHYSICAL EXAM  Constitutional:  Appears well, no apparent distress Ability to communicate: Normal Respiratory: non-labored breathing Skin: no rash noted Head: normocephalic, atraumatic Neck: no visible thyroid enlargement Eyes: extraocular movements intact Neurologic: alert and oriented x3 Psychiatric: normal mood, affect, and behavior  Cervical: Palpation: left trapezial spasm, left trapezius tenderness and left paracervical tenderness ROM: Diminished range of motion in all plains.  Patient notes pain at extremes of extension and rotation to left.  Stiffness at extremes of extension and rotation to the left MMT: Motor exam is 5/5 through out bilateral upper extremities. Sensation: Light touch and pain is intact throughout bilateral upper extremities. Reflexes: No sustained clonus. Special Testing: Positive left Spurling test

## 2024-08-14 ENCOUNTER — APPOINTMENT (OUTPATIENT)
Dept: PAIN MANAGEMENT | Facility: CLINIC | Age: 49
End: 2024-08-14
Payer: COMMERCIAL

## 2024-08-14 DIAGNOSIS — M54.12 RADICULOPATHY, CERVICAL REGION: ICD-10-CM

## 2024-08-14 DIAGNOSIS — G89.4 CHRONIC PAIN SYNDROME: ICD-10-CM

## 2024-08-14 DIAGNOSIS — M54.2 CERVICALGIA: ICD-10-CM

## 2024-08-14 PROCEDURE — 99213 OFFICE O/P EST LOW 20 MIN: CPT

## 2024-08-14 RX ORDER — OXYCODONE HYDROCHLORIDE 40 MG/1
40 TABLET, FILM COATED, EXTENDED RELEASE ORAL
Qty: 60 | Refills: 0 | Status: ACTIVE | COMMUNITY
Start: 2024-08-14 | End: 1900-01-01

## 2024-08-14 NOTE — ASSESSMENT
[FreeTextEntry1] : no longer with relief from oxycontin 30mg, not lasting long enough    R TFESI with 95% relief pp with improved ROM and ADLS  Pt. presents with 95% Relief of pain and improvement in ROM and ADLS post injection.    Able to sit, stand, walk, sleep for longer periods of time.

## 2024-08-14 NOTE — PHYSICAL EXAM
[de-identified] : PHYSICAL EXAM  Constitutional:  Appears well, no apparent distress Ability to communicate: Normal Respiratory: non-labored breathing Skin: no rash noted Head: normocephalic, atraumatic Neck: no visible thyroid enlargement Eyes: extraocular movements intact Neurologic: alert and oriented x3 Psychiatric: normal mood, affect, and behavior  Cervical: Palpation: left trapezial spasm, left trapezius tenderness and left paracervical tenderness ROM: Diminished range of motion in all plains.  Patient notes pain at extremes of extension and rotation to left.  Stiffness at extremes of extension and rotation to the left MMT: Motor exam is 5/5 through out bilateral upper extremities. Sensation: Light touch and pain is intact throughout bilateral upper extremities. Reflexes: No sustained clonus. Special Testing: Positive left Spurling test

## 2024-08-14 NOTE — PHYSICAL EXAM
[de-identified] : PHYSICAL EXAM  Constitutional:  Appears well, no apparent distress Ability to communicate: Normal Respiratory: non-labored breathing Skin: no rash noted Head: normocephalic, atraumatic Neck: no visible thyroid enlargement Eyes: extraocular movements intact Neurologic: alert and oriented x3 Psychiatric: normal mood, affect, and behavior  Cervical: Palpation: left trapezial spasm, left trapezius tenderness and left paracervical tenderness ROM: Diminished range of motion in all plains.  Patient notes pain at extremes of extension and rotation to left.  Stiffness at extremes of extension and rotation to the left MMT: Motor exam is 5/5 through out bilateral upper extremities. Sensation: Light touch and pain is intact throughout bilateral upper extremities. Reflexes: No sustained clonus. Special Testing: Positive left Spurling test

## 2024-09-09 ENCOUNTER — APPOINTMENT (OUTPATIENT)
Dept: PAIN MANAGEMENT | Facility: CLINIC | Age: 49
End: 2024-09-09
Payer: COMMERCIAL

## 2024-09-09 VITALS — BODY MASS INDEX: 21.52 KG/M2 | HEIGHT: 68 IN | WEIGHT: 142 LBS

## 2024-09-09 DIAGNOSIS — G89.4 CHRONIC PAIN SYNDROME: ICD-10-CM

## 2024-09-09 DIAGNOSIS — M54.12 RADICULOPATHY, CERVICAL REGION: ICD-10-CM

## 2024-09-09 DIAGNOSIS — Z98.1 ARTHRODESIS STATUS: ICD-10-CM

## 2024-09-09 DIAGNOSIS — M54.2 CERVICALGIA: ICD-10-CM

## 2024-09-09 PROCEDURE — 99213 OFFICE O/P EST LOW 20 MIN: CPT

## 2024-09-09 NOTE — PHYSICAL EXAM
[de-identified] : PHYSICAL EXAM  Constitutional:  Appears well, no apparent distress Ability to communicate: Normal Respiratory: non-labored breathing Skin: no rash noted Head: normocephalic, atraumatic Neck: no visible thyroid enlargement Eyes: extraocular movements intact Neurologic: alert and oriented x3 Psychiatric: normal mood, affect, and behavior  Cervical: Palpation: left trapezial spasm, left trapezius tenderness and left paracervical tenderness ROM: Diminished range of motion in all plains.  Patient notes pain at extremes of extension and rotation to left.  Stiffness at extremes of extension and rotation to the left MMT: Motor exam is 5/5 through out bilateral upper extremities. Sensation: Light touch and pain is intact throughout bilateral upper extremities. Reflexes: No sustained clonus. Special Testing: Positive left Spurling test

## 2024-09-09 NOTE — HISTORY OF PRESENT ILLNESS
[Neck] : neck [6] : 6 [7] : 7 [Radiating] : radiating [Sharp] : sharp [Shooting] : shooting [Stabbing] : stabbing [Throbbing] : throbbing [Constant] : constant [Sleep] : sleep [Meds] : meds [Lying in bed] : lying in bed [FreeTextEntry1] : pt is following up for neck pain  TPI-8/9/2024 [] : This patient has had an injection before: no [FreeTextEntry7] : left shoulder pain

## 2024-09-09 NOTE — DISCUSSION/SUMMARY
[de-identified] : Medication Renewal  The patient is stable on current pain medication with analgesia and without notable side effects or any obvious aberrant behaviors exhibited.  There is clinically meaningful improvement in pain and function that outweighs risks to patient safety. I have discussed risks and realistic benefits of opioid therapy and patient and clinician responsibilities for managing therapy. Pain agreement has been signed.  Will renew medication today.  Follow up in 4-6 weeks or sooner if there are any problems.  Conservative Care Continue Home exercises, stretching, activity modification, physical therapy, and conservative care.   NO NEW FINDINGS Patient denies new numbness, tingling, weakness, fevers, chills and bowel/bladder incontinence    I have consulted the  Registry for the purpose of reviewing the patient's controlled substance

## 2024-09-27 ENCOUNTER — TRANSCRIPTION ENCOUNTER (OUTPATIENT)
Age: 49
End: 2024-09-27

## 2024-10-07 ENCOUNTER — APPOINTMENT (OUTPATIENT)
Dept: PAIN MANAGEMENT | Facility: CLINIC | Age: 49
End: 2024-10-07
Payer: COMMERCIAL

## 2024-10-07 VITALS — BODY MASS INDEX: 21.52 KG/M2 | WEIGHT: 142 LBS | HEIGHT: 68 IN

## 2024-10-07 DIAGNOSIS — G89.4 CHRONIC PAIN SYNDROME: ICD-10-CM

## 2024-10-07 DIAGNOSIS — M54.12 RADICULOPATHY, CERVICAL REGION: ICD-10-CM

## 2024-10-07 DIAGNOSIS — M54.2 CERVICALGIA: ICD-10-CM

## 2024-10-07 PROCEDURE — 99214 OFFICE O/P EST MOD 30 MIN: CPT

## 2024-10-30 ENCOUNTER — APPOINTMENT (OUTPATIENT)
Dept: PAIN MANAGEMENT | Facility: CLINIC | Age: 49
End: 2024-10-30
Payer: COMMERCIAL

## 2024-10-30 PROCEDURE — 99214 OFFICE O/P EST MOD 30 MIN: CPT

## 2024-11-04 ENCOUNTER — APPOINTMENT (OUTPATIENT)
Dept: PAIN MANAGEMENT | Facility: CLINIC | Age: 49
End: 2024-11-04
Payer: COMMERCIAL

## 2024-11-04 VITALS — BODY MASS INDEX: 21.52 KG/M2 | WEIGHT: 142 LBS | HEIGHT: 68 IN

## 2024-11-04 DIAGNOSIS — M54.12 RADICULOPATHY, CERVICAL REGION: ICD-10-CM

## 2024-11-04 DIAGNOSIS — M54.2 CERVICALGIA: ICD-10-CM

## 2024-11-04 DIAGNOSIS — G89.4 CHRONIC PAIN SYNDROME: ICD-10-CM

## 2024-11-04 PROCEDURE — 99213 OFFICE O/P EST LOW 20 MIN: CPT

## 2024-11-06 ENCOUNTER — APPOINTMENT (OUTPATIENT)
Dept: MRI IMAGING | Facility: CLINIC | Age: 49
End: 2024-11-06

## 2024-12-02 ENCOUNTER — APPOINTMENT (OUTPATIENT)
Dept: PAIN MANAGEMENT | Facility: CLINIC | Age: 49
End: 2024-12-02

## 2024-12-02 VITALS — BODY MASS INDEX: 21.52 KG/M2 | HEIGHT: 68 IN | WEIGHT: 142 LBS

## 2024-12-02 DIAGNOSIS — M54.2 CERVICALGIA: ICD-10-CM

## 2024-12-02 DIAGNOSIS — Z98.1 ARTHRODESIS STATUS: ICD-10-CM

## 2024-12-02 DIAGNOSIS — M54.12 RADICULOPATHY, CERVICAL REGION: ICD-10-CM

## 2024-12-02 DIAGNOSIS — G89.4 CHRONIC PAIN SYNDROME: ICD-10-CM

## 2024-12-02 PROCEDURE — 99214 OFFICE O/P EST MOD 30 MIN: CPT | Mod: 25

## 2024-12-02 PROCEDURE — J3490M: CUSTOM

## 2024-12-02 PROCEDURE — 20552 NJX 1/MLT TRIGGER POINT 1/2: CPT

## 2024-12-02 RX ORDER — OXYCODONE HYDROCHLORIDE 30 MG/1
30 TABLET, FILM COATED, EXTENDED RELEASE ORAL 3 TIMES DAILY
Qty: 90 | Refills: 0 | Status: ACTIVE | COMMUNITY
Start: 2024-12-02 | End: 1900-01-01

## 2024-12-23 ENCOUNTER — APPOINTMENT (OUTPATIENT)
Dept: PAIN MANAGEMENT | Facility: CLINIC | Age: 49
End: 2024-12-23
Payer: COMMERCIAL

## 2024-12-23 PROCEDURE — 99213 OFFICE O/P EST LOW 20 MIN: CPT

## 2025-01-06 ENCOUNTER — NON-APPOINTMENT (OUTPATIENT)
Age: 50
End: 2025-01-06

## 2025-01-24 ENCOUNTER — APPOINTMENT (OUTPATIENT)
Dept: PAIN MANAGEMENT | Facility: CLINIC | Age: 50
End: 2025-01-24
Payer: COMMERCIAL

## 2025-01-24 VITALS — WEIGHT: 142 LBS | HEIGHT: 68 IN | BODY MASS INDEX: 21.52 KG/M2

## 2025-01-24 DIAGNOSIS — M54.12 RADICULOPATHY, CERVICAL REGION: ICD-10-CM

## 2025-01-24 DIAGNOSIS — M54.2 CERVICALGIA: ICD-10-CM

## 2025-01-24 DIAGNOSIS — Z98.1 ARTHRODESIS STATUS: ICD-10-CM

## 2025-01-24 DIAGNOSIS — G89.4 CHRONIC PAIN SYNDROME: ICD-10-CM

## 2025-01-24 PROCEDURE — 99213 OFFICE O/P EST LOW 20 MIN: CPT

## 2025-01-30 ENCOUNTER — APPOINTMENT (OUTPATIENT)
Dept: PAIN MANAGEMENT | Facility: CLINIC | Age: 50
End: 2025-01-30
Payer: COMMERCIAL

## 2025-01-30 VITALS — WEIGHT: 142 LBS | BODY MASS INDEX: 21.52 KG/M2 | HEIGHT: 68 IN

## 2025-01-30 PROCEDURE — 99212 OFFICE O/P EST SF 10 MIN: CPT

## 2025-01-30 PROCEDURE — 99213 OFFICE O/P EST LOW 20 MIN: CPT

## 2025-02-05 ENCOUNTER — APPOINTMENT (OUTPATIENT)
Dept: PAIN MANAGEMENT | Facility: CLINIC | Age: 50
End: 2025-02-05
Payer: COMMERCIAL

## 2025-02-05 DIAGNOSIS — M54.17 RADICULOPATHY, LUMBOSACRAL REGION: ICD-10-CM

## 2025-02-05 PROCEDURE — 64484 NJX AA&/STRD TFRM EPI L/S EA: CPT | Mod: 59,RT

## 2025-02-05 PROCEDURE — 64483 NJX AA&/STRD TFRM EPI L/S 1: CPT | Mod: RT

## 2025-02-14 ENCOUNTER — APPOINTMENT (OUTPATIENT)
Dept: PAIN MANAGEMENT | Facility: CLINIC | Age: 50
End: 2025-02-14

## 2025-02-14 VITALS — HEIGHT: 68 IN | BODY MASS INDEX: 21.82 KG/M2 | WEIGHT: 144 LBS

## 2025-02-14 DIAGNOSIS — M54.17 RADICULOPATHY, LUMBOSACRAL REGION: ICD-10-CM

## 2025-02-14 DIAGNOSIS — M54.12 RADICULOPATHY, CERVICAL REGION: ICD-10-CM

## 2025-02-14 DIAGNOSIS — Z98.1 ARTHRODESIS STATUS: ICD-10-CM

## 2025-02-14 DIAGNOSIS — G89.4 CHRONIC PAIN SYNDROME: ICD-10-CM

## 2025-02-14 PROCEDURE — 99214 OFFICE O/P EST MOD 30 MIN: CPT

## 2025-03-03 ENCOUNTER — APPOINTMENT (OUTPATIENT)
Dept: PAIN MANAGEMENT | Facility: CLINIC | Age: 50
End: 2025-03-03
Payer: COMMERCIAL

## 2025-03-03 DIAGNOSIS — M54.17 RADICULOPATHY, LUMBOSACRAL REGION: ICD-10-CM

## 2025-03-03 PROCEDURE — 64483 NJX AA&/STRD TFRM EPI L/S 1: CPT | Mod: LT

## 2025-03-03 PROCEDURE — 64484 NJX AA&/STRD TFRM EPI L/S EA: CPT | Mod: LT,59

## 2025-03-14 ENCOUNTER — APPOINTMENT (OUTPATIENT)
Dept: PAIN MANAGEMENT | Facility: CLINIC | Age: 50
End: 2025-03-14

## 2025-03-19 ENCOUNTER — APPOINTMENT (OUTPATIENT)
Dept: PAIN MANAGEMENT | Facility: CLINIC | Age: 50
End: 2025-03-19

## 2025-03-19 VITALS — BODY MASS INDEX: 21.82 KG/M2 | HEIGHT: 68 IN | WEIGHT: 144 LBS

## 2025-03-19 DIAGNOSIS — M54.2 CERVICALGIA: ICD-10-CM

## 2025-03-19 DIAGNOSIS — M54.12 RADICULOPATHY, CERVICAL REGION: ICD-10-CM

## 2025-03-19 DIAGNOSIS — M79.18 MYALGIA, OTHER SITE: ICD-10-CM

## 2025-03-19 DIAGNOSIS — M54.17 RADICULOPATHY, LUMBOSACRAL REGION: ICD-10-CM

## 2025-03-19 DIAGNOSIS — Z98.1 ARTHRODESIS STATUS: ICD-10-CM

## 2025-03-19 DIAGNOSIS — G89.4 CHRONIC PAIN SYNDROME: ICD-10-CM

## 2025-03-19 PROCEDURE — 20552 NJX 1/MLT TRIGGER POINT 1/2: CPT

## 2025-03-19 PROCEDURE — 99213 OFFICE O/P EST LOW 20 MIN: CPT | Mod: 25

## 2025-03-19 PROCEDURE — J3490M: CUSTOM

## 2025-04-03 ENCOUNTER — NON-APPOINTMENT (OUTPATIENT)
Age: 50
End: 2025-04-03

## 2025-04-04 ENCOUNTER — APPOINTMENT (OUTPATIENT)
Dept: PAIN MANAGEMENT | Facility: CLINIC | Age: 50
End: 2025-04-04

## 2025-04-16 ENCOUNTER — APPOINTMENT (OUTPATIENT)
Dept: PAIN MANAGEMENT | Facility: CLINIC | Age: 50
End: 2025-04-16
Payer: COMMERCIAL

## 2025-04-16 PROCEDURE — 99213 OFFICE O/P EST LOW 20 MIN: CPT | Mod: 93

## 2025-04-21 ENCOUNTER — APPOINTMENT (OUTPATIENT)
Dept: PAIN MANAGEMENT | Facility: CLINIC | Age: 50
End: 2025-04-21

## 2025-04-21 ENCOUNTER — NON-APPOINTMENT (OUTPATIENT)
Age: 50
End: 2025-04-21

## 2025-04-21 ENCOUNTER — APPOINTMENT (OUTPATIENT)
Dept: ORTHOPEDIC SURGERY | Facility: CLINIC | Age: 50
End: 2025-04-21
Payer: COMMERCIAL

## 2025-04-21 VITALS — WEIGHT: 145 LBS | HEIGHT: 68 IN | BODY MASS INDEX: 21.98 KG/M2

## 2025-04-21 DIAGNOSIS — M54.12 RADICULOPATHY, CERVICAL REGION: ICD-10-CM

## 2025-04-21 DIAGNOSIS — M47.812 SPONDYLOSIS W/OUT MYELOPATHY OR RADICULOPATHY, CERVICAL REGION: ICD-10-CM

## 2025-04-21 PROCEDURE — 99203 OFFICE O/P NEW LOW 30 MIN: CPT

## 2025-05-08 ENCOUNTER — RX RENEWAL (OUTPATIENT)
Age: 50
End: 2025-05-08

## 2025-05-16 ENCOUNTER — APPOINTMENT (OUTPATIENT)
Dept: PAIN MANAGEMENT | Facility: CLINIC | Age: 50
End: 2025-05-16
Payer: COMMERCIAL

## 2025-05-16 VITALS — WEIGHT: 145 LBS | HEIGHT: 68 IN | BODY MASS INDEX: 21.98 KG/M2

## 2025-05-16 DIAGNOSIS — G89.4 CHRONIC PAIN SYNDROME: ICD-10-CM

## 2025-05-16 DIAGNOSIS — M54.12 RADICULOPATHY, CERVICAL REGION: ICD-10-CM

## 2025-05-16 PROCEDURE — 99213 OFFICE O/P EST LOW 20 MIN: CPT

## 2025-05-19 ENCOUNTER — RX RENEWAL (OUTPATIENT)
Age: 50
End: 2025-05-19

## 2025-06-13 ENCOUNTER — APPOINTMENT (OUTPATIENT)
Dept: PAIN MANAGEMENT | Facility: CLINIC | Age: 50
End: 2025-06-13
Payer: COMMERCIAL

## 2025-06-13 VITALS — BODY MASS INDEX: 21.98 KG/M2 | HEIGHT: 68 IN | WEIGHT: 145 LBS

## 2025-06-13 PROCEDURE — 99213 OFFICE O/P EST LOW 20 MIN: CPT

## 2025-06-13 RX ORDER — OXYCODONE HYDROCHLORIDE 10 MG/1
10 TABLET, FILM COATED, EXTENDED RELEASE ORAL 3 TIMES DAILY
Qty: 90 | Refills: 0 | Status: ACTIVE | COMMUNITY
Start: 2025-06-13 | End: 1900-01-01

## 2025-07-11 ENCOUNTER — APPOINTMENT (OUTPATIENT)
Dept: PAIN MANAGEMENT | Facility: CLINIC | Age: 50
End: 2025-07-11
Payer: COMMERCIAL

## 2025-07-11 PROBLEM — M47.812 SPONDYLOSIS OF CERVICAL REGION WITHOUT MYELOPATHY OR RADICULOPATHY: Status: ACTIVE | Noted: 2025-04-21

## 2025-07-11 PROCEDURE — 99213 OFFICE O/P EST LOW 20 MIN: CPT

## 2025-08-08 ENCOUNTER — APPOINTMENT (OUTPATIENT)
Dept: PAIN MANAGEMENT | Facility: CLINIC | Age: 50
End: 2025-08-08
Payer: COMMERCIAL

## 2025-08-08 VITALS — WEIGHT: 145 LBS | HEIGHT: 68 IN | BODY MASS INDEX: 21.98 KG/M2

## 2025-08-08 DIAGNOSIS — M54.12 RADICULOPATHY, CERVICAL REGION: ICD-10-CM

## 2025-08-08 DIAGNOSIS — M79.18 MYALGIA, OTHER SITE: ICD-10-CM

## 2025-08-08 DIAGNOSIS — G89.4 CHRONIC PAIN SYNDROME: ICD-10-CM

## 2025-08-08 DIAGNOSIS — M54.2 CERVICALGIA: ICD-10-CM

## 2025-08-08 PROCEDURE — 99213 OFFICE O/P EST LOW 20 MIN: CPT

## 2025-08-08 RX ORDER — OXYCODONE AND ACETAMINOPHEN 10; 325 MG/1; MG/1
10-325 TABLET ORAL EVERY 4 HOURS
Qty: 180 | Refills: 0 | Status: ACTIVE | COMMUNITY
Start: 2025-08-08 | End: 1900-01-01

## 2025-08-27 RX ORDER — METHYLPREDNISOLONE 4 MG/1
4 TABLET ORAL
Qty: 1 | Refills: 0 | Status: ACTIVE | COMMUNITY
Start: 2025-08-27 | End: 1900-01-01

## 2025-09-05 ENCOUNTER — NON-APPOINTMENT (OUTPATIENT)
Age: 50
End: 2025-09-05

## 2025-09-05 ENCOUNTER — APPOINTMENT (OUTPATIENT)
Dept: PAIN MANAGEMENT | Facility: CLINIC | Age: 50
End: 2025-09-05
Payer: COMMERCIAL

## 2025-09-05 PROCEDURE — 99213 OFFICE O/P EST LOW 20 MIN: CPT | Mod: 3W
